# Patient Record
Sex: MALE | NOT HISPANIC OR LATINO | Employment: OTHER | URBAN - METROPOLITAN AREA
[De-identification: names, ages, dates, MRNs, and addresses within clinical notes are randomized per-mention and may not be internally consistent; named-entity substitution may affect disease eponyms.]

---

## 2018-03-13 ENCOUNTER — OFFICE VISIT (OUTPATIENT)
Dept: URGENT CARE | Facility: CLINIC | Age: 28
End: 2018-03-13
Payer: COMMERCIAL

## 2018-03-13 VITALS
OXYGEN SATURATION: 100 % | HEIGHT: 66 IN | HEART RATE: 81 BPM | BODY MASS INDEX: 21.86 KG/M2 | WEIGHT: 136 LBS | TEMPERATURE: 96.9 F | DIASTOLIC BLOOD PRESSURE: 74 MMHG | RESPIRATION RATE: 16 BRPM | SYSTOLIC BLOOD PRESSURE: 129 MMHG

## 2018-03-13 DIAGNOSIS — M25.532 LEFT WRIST PAIN: Primary | ICD-10-CM

## 2018-03-13 PROCEDURE — 99213 OFFICE O/P EST LOW 20 MIN: CPT | Performed by: FAMILY MEDICINE

## 2018-03-13 NOTE — LETTER
March 13, 2018     Patient: Mehran Gomez   YOB: 1990   Date of Visit: 3/13/2018       To Whom it May Concern:    Gloria Burton was seen in my clinic on 3/13/2018  If you have any questions or concerns, please don't hesitate to call           Sincerely,          Jonah Bernal DO        CC: No Recipients

## 2018-03-13 NOTE — PATIENT INSTRUCTIONS
Over-the-counter ibuprofen 20 milligram tablets 3-4 tablets every hours daily with food  Ice 20 minutes 3-4 times daily as needed  Follow-up with PCP if symptoms not improving

## 2018-03-13 NOTE — PROGRESS NOTES
[facility]        NAME: Javi Fields is a 28 y o  male  : 1990    MRN: [id number]  DATE: 2018  TIME: 7:48 PM    Assessment and Plan   Left wrist pain [M25 532]  1  Left wrist pain           Patient Instructions       Follow up with PCP in 3-5 days  Proceed to  ER if symptoms worsen  Chief Complaint     Chief Complaint   Patient presents with    Wrist Pain     R medial; pain 6/10 hurts to  something          History of Present Illness       Patient works a job working with pharmaceutical equipments, he does a lot of hard grasping and repetitive movements with the wrist   He is right-hand dominant he has a lot of pain with grasping  He took off from work yesterday and today  He does require a work note  He states his symptoms have improved since he has not worked past 2 days        Review of Systems   Review of Systems   Musculoskeletal: Positive for arthralgias  Current Medications     No current outpatient prescriptions on file  Current Allergies     Allergies as of 2018 - Reviewed 2018   Allergen Reaction Noted    Carboplatin Anaphylaxis 11/15/2004    Ondansetron Nausea Only 11/15/2004            The following portions of the patient's history were reviewed and updated as appropriate: allergies, current medications, past family history, past medical history, past social history, past surgical history and problem list      Past Medical History:   Diagnosis Date    Brain tumor, astrocytoma ([facility] [address] )        History reviewed  No pertinent surgical history  No family history on file  Medications have been verified          Objective   /74   Pulse 81   Temp (!) 96 9 °F (36 1 °C) (Tympanic)   Resp 16   Ht 5' 6" (1 676 m)   Wt 61 7 kg (136 lb)   SpO2 100%   BMI 21 95 kg/m²        Physical Exam     Physical Exam   Musculoskeletal:   Full active range of motion of the wrist, mild tenderness to palpation along the ulnar styloid, Finkelstein's negative, mild discomfort with abduction of the wrist   Normal sensation, vascularly intact full  with slightly decreased strength

## 2018-09-02 ENCOUNTER — OFFICE VISIT (OUTPATIENT)
Dept: URGENT CARE | Facility: CLINIC | Age: 28
End: 2018-09-02
Payer: COMMERCIAL

## 2018-09-02 VITALS
HEART RATE: 52 BPM | WEIGHT: 127 LBS | HEIGHT: 66 IN | RESPIRATION RATE: 16 BRPM | OXYGEN SATURATION: 100 % | BODY MASS INDEX: 20.41 KG/M2 | TEMPERATURE: 96.6 F

## 2018-09-02 DIAGNOSIS — S61.412A LACERATION OF LEFT HAND WITHOUT FOREIGN BODY, INITIAL ENCOUNTER: Primary | ICD-10-CM

## 2018-09-02 PROCEDURE — 99213 OFFICE O/P EST LOW 20 MIN: CPT | Performed by: PREVENTIVE MEDICINE

## 2018-09-02 PROCEDURE — 12001 RPR S/N/AX/GEN/TRNK 2.5CM/<: CPT | Performed by: PREVENTIVE MEDICINE

## 2018-09-02 NOTE — PROGRESS NOTES
[facility]        NAME: Donovan Born is a 28 y o  male  : 1990    MRN: [id number]  DATE: 2018  TIME: 11:31 AM    Assessment and Plan   Laceration of left hand without foreign body, initial encounter [S61 412A]  1  Laceration of left hand without foreign body, initial encounter           Patient Instructions       Follow up with PCP in 3-5 days  Proceed to  ER if symptoms worsen  Chief Complaint     Chief Complaint   Patient presents with    Hand Laceration     7am packingup from camping  Cut right hand with knife  States last tetanus 3/4 years ago         History of Present Illness       Cut his left hand on a camping knife today  Last tetanus within 5 years  Review of Systems   Review of Systems   Skin: Positive for wound  Current Medications     No current outpatient prescriptions on file  Current Allergies     Allergies as of 2018 - Reviewed 2018   Allergen Reaction Noted    Carboplatin Anaphylaxis 11/15/2004    Ondansetron Nausea Only 11/15/2004            The following portions of the patient's history were reviewed and updated as appropriate: allergies, current medications, past family history, past medical history, past social history, past surgical history and problem list      Past Medical History:   Diagnosis Date    Brain tumor, astrocytoma ([facility] [address] )        No past surgical history on file  No family history on file  Medications have been verified          Objective   Pulse (!) 52   Temp (!) 96 6 °F (35 9 °C)   Resp 16   Ht 5' 6" (1 676 m)   Wt 57 6 kg (127 lb)   SpO2 100%   BMI 20 50 kg/m²        Physical Exam     Physical Exam   Skin:   2 cm laceration left palm   Laceration repair  Date/Time: 2018 11:32 AM  Performed by: Will Garza  Authorized by: Will Garza   Body area: upper extremity  Location details: left hand  Laceration length: 2 cm  Tendon involvement: none  Nerve involvement: none  Vascular damage: no    Wound Dehiscence:  Superficial Wound Dehiscence: simple closure      Procedure Details:  Irrigation solution: saline  Debridement: none  Skin closure: glue  Approximation: close  Approximation difficulty: simple  Dressing: 4x4 sterile gauze

## 2018-09-02 NOTE — LETTER
September 2, 2018     Patient: Ana Comment   YOB: 1990   Date of Visit: 9/2/2018       To Whom It May Concern: It is my medical opinion that Marine Tavares may return to work on 8/5  If you have any questions or concerns, please don't hesitate to call           Sincerely,        Luciano Parrish MD    CC: No Recipients

## 2018-09-02 NOTE — PATIENT INSTRUCTIONS
Keep the wound clean and dry, do not get wet for the next 24 hours  The glue should fall off in 5-7 days  Only recheck if there are signs of infection

## 2018-09-05 ENCOUNTER — OFFICE VISIT (OUTPATIENT)
Dept: FAMILY MEDICINE CLINIC | Facility: CLINIC | Age: 28
End: 2018-09-05
Payer: COMMERCIAL

## 2018-09-05 VITALS
WEIGHT: 127 LBS | DIASTOLIC BLOOD PRESSURE: 60 MMHG | HEIGHT: 66 IN | BODY MASS INDEX: 20.41 KG/M2 | SYSTOLIC BLOOD PRESSURE: 100 MMHG | HEART RATE: 46 BPM | OXYGEN SATURATION: 98 % | RESPIRATION RATE: 16 BRPM

## 2018-09-05 DIAGNOSIS — S65.312D LACERATION OF DEEP PALMAR ARCH OF LEFT HAND, SUBSEQUENT ENCOUNTER: Primary | ICD-10-CM

## 2018-09-05 PROCEDURE — 99213 OFFICE O/P EST LOW 20 MIN: CPT | Performed by: FAMILY MEDICINE

## 2018-09-05 PROCEDURE — 3008F BODY MASS INDEX DOCD: CPT | Performed by: FAMILY MEDICINE

## 2018-09-05 NOTE — PROGRESS NOTES
[facility]        NAME: Monie Bledsoe is a 28 y o  male  : 1990    MRN: [id number]  DATE: 2018  TIME: 3:07 PM    Assessment and Plan   Laceration of deep palmar arch of left hand, subsequent encounter [D50 645S]  1  Laceration of deep palmar arch of left hand, subsequent encounter         No problem-specific Assessment & Plan notes found for this encounter  Patient Instructions     Patient Instructions   I would advise a small amount of bacitracin ointment to the open area the wound and keep this covered and supported with a Band-Aid tape your working  Chief Complaint     Chief Complaint   Patient presents with    Follow-up     urgent care f/u - left hand wound         History of Present Illness       Recheck laceration on the left hand  Had cut with a knife  Seen in urgent care and had glue the area  Has partial adherence, there is some area which is now gait back open again  The wound does look clean  Review of Systems   Review of Systems   Constitutional: Negative for chills, diaphoresis and fatigue  Skin: Positive for wound  Negative for rash  Current Medications     No current outpatient prescriptions on file  Current Allergies     Allergies as of 2018 - Reviewed 2018   Allergen Reaction Noted    Carboplatin Anaphylaxis 11/15/2004    Ondansetron Nausea Only 11/15/2004            The following portions of the patient's history were reviewed and updated as appropriate: allergies, current medications, past family history, past medical history, past social history, past surgical history and problem list      Past Medical History:   Diagnosis Date    Brain tumor, astrocytoma ([facility] [address] )        No past surgical history on file      Family History   Problem Relation Age of Onset    No Known Problems Mother     No Known Problems Father     Substance Abuse Neg Hx     Mental illness Neg Hx Medications have been verified  Objective   /60 (BP Location: Left arm, Patient Position: Sitting, Cuff Size: Standard)   Pulse (!) 46   Resp 16   Ht 5' 6" (1 676 m)   Wt 57 6 kg (127 lb)   SpO2 98%   BMI 20 50 kg/m²        Physical Exam     Physical Exam   Skin:   Left for 2nd webspace laceration approximately 3/4 of an inch long  The proximal side has opened up with about 387 in spread

## 2018-09-05 NOTE — LETTER
September 5, 2018     Patient: Chacho Pouch   YOB: 1990   Date of Visit: 9/5/2018       To Whom it May Concern:    Jasielsherryconsuelo Yin is under my professional care  He was seen in my office on 9/5/2018  He may return to work on 9/6/2018  Excuse work absence due to illness  If you have any questions or concerns, please don't hesitate to call           Sincerely,          Mely Dolna MD        CC: No Recipients

## 2018-09-05 NOTE — PATIENT INSTRUCTIONS
I would advise a small amount of bacitracin ointment to the open area the wound and keep this covered and supported with a Band-Aid tape your working

## 2018-10-17 ENCOUNTER — OFFICE VISIT (OUTPATIENT)
Dept: URGENT CARE | Facility: CLINIC | Age: 28
End: 2018-10-17
Payer: COMMERCIAL

## 2018-10-17 VITALS
SYSTOLIC BLOOD PRESSURE: 118 MMHG | HEIGHT: 66 IN | DIASTOLIC BLOOD PRESSURE: 56 MMHG | HEART RATE: 113 BPM | RESPIRATION RATE: 16 BRPM | TEMPERATURE: 98.4 F | BODY MASS INDEX: 20.41 KG/M2 | WEIGHT: 127 LBS

## 2018-10-17 DIAGNOSIS — J02.9 SORE THROAT: ICD-10-CM

## 2018-10-17 DIAGNOSIS — J40 BRONCHITIS: Primary | ICD-10-CM

## 2018-10-17 LAB — S PYO AG THROAT QL: NEGATIVE

## 2018-10-17 PROCEDURE — 87147 CULTURE TYPE IMMUNOLOGIC: CPT | Performed by: PHYSICIAN ASSISTANT

## 2018-10-17 PROCEDURE — 99213 OFFICE O/P EST LOW 20 MIN: CPT | Performed by: PHYSICIAN ASSISTANT

## 2018-10-17 PROCEDURE — 87070 CULTURE OTHR SPECIMN AEROBIC: CPT | Performed by: PHYSICIAN ASSISTANT

## 2018-10-17 RX ORDER — AZITHROMYCIN 250 MG/1
TABLET, FILM COATED ORAL
Qty: 6 TABLET | Refills: 0 | Status: SHIPPED | OUTPATIENT
Start: 2018-10-17 | End: 2018-10-22

## 2018-10-17 NOTE — PATIENT INSTRUCTIONS
Take azithromycin as directed  Antihistamine such as allegra or zyrtec  Increase fluids and rest  If no improvement in symptoms in 2-3 days, f/u with PCP  If anything worsens, f/u sooner

## 2018-10-17 NOTE — LETTER
October 17, 2018     Patient: Jose D Conley   YOB: 1990   Date of Visit: 10/17/2018       To Whom it May Concern:    Katlyn Swift was seen in my clinic on 10/17/2018  If you have any questions or concerns, please don't hesitate to call           Sincerely,          QU Lori Grove        CC: No Recipients

## 2018-10-17 NOTE — PROGRESS NOTES
NAME: Neeru Saenz is a 28 y o  male  : 1990    MRN: [id number]      Assessment and Plan   Bronchitis [J40]  1  Bronchitis  azithromycin (ZITHROMAX) 250 mg tablet    Throat culture   2  Sore throat  POCT rapid strepA           Patient Instructions   Patient Instructions   Take azithromycin as directed  Antihistamine such as allegra or zyrtec  Increase fluids and rest  If no improvement in symptoms in 2-3 days, f/u with PCP  If anything worsens, f/u sooner    Proceed to ER if symptoms worsen  History of Present Illness     Patient presents accompanied by fiance complaining of chest tightness and productive cough for over a week  He states he started with a URI last week which started to get better but then he acutely worsened a few days ago  He reports having a productive cough and sore throat but denies any sinus p/p, ear pain or runny nose  He reports he has not taken anything for his sx  Patient denies fevers, chills, CP, palpitations, SOB or dyspnea  He denies hx of asthma or COPD or smoking  Reports hx of brain tumor which is in remission  Review of Systems   Review of Systems   Constitutional: Negative for chills and fever  HENT: Positive for congestion and sore throat  Negative for ear pain, sinus pain and sinus pressure  Respiratory: Positive for cough and chest tightness  Negative for shortness of breath and wheezing  Cardiovascular: Negative for chest pain and palpitations  Current Medications       Current Outpatient Prescriptions:     azithromycin (ZITHROMAX) 250 mg tablet, Take 2 tablets today then 1 tablet daily x 4 days, Disp: 6 tablet, Rfl: 0    Current Allergies     Allergies as of 10/17/2018 - Reviewed 10/17/2018   Allergen Reaction Noted    Carboplatin Anaphylaxis 11/15/2004    Ondansetron Nausea Only 11/15/2004              Past Medical History:   Diagnosis Date    Brain tumor, astrocytoma ([facility] [address] )        No past surgical history on file      Family History   Problem Relation Age of Onset    No Known Problems Mother     No Known Problems Father     Substance Abuse Neg Hx     Mental illness Neg Hx          Medications have been verified  The following portions of the patient's history were reviewed and updated as appropriate: allergies, current medications, past family history, past medical history, past social history, past surgical history and problem list     Objective   /56   Pulse (!) 113   Temp 98 4 °F (36 9 °C)   Resp 16   Ht 5' 6" (1 676 m)   Wt 57 6 kg (127 lb)   BMI 20 50 kg/m²      Physical Exam     Physical Exam   Constitutional: He appears well-developed and well-nourished  No distress  HENT:   Tms clear b/l without erythema or bulging  Nasal mucosa without erythema or edema  Oropharynx without erythema or edema or exudates  +PND   Cardiovascular: Normal rate, regular rhythm and normal heart sounds  Pulmonary/Chest: Effort normal  No respiratory distress  He has no wheezes  He has no rales  Mild rhonchi to b/l bases which clear with coughing  Lymphadenopathy:     He has no cervical adenopathy

## 2018-10-19 LAB
BACTERIA THROAT CULT: ABNORMAL
BACTERIA THROAT CULT: ABNORMAL

## 2018-10-22 ENCOUNTER — OFFICE VISIT (OUTPATIENT)
Dept: FAMILY MEDICINE CLINIC | Facility: CLINIC | Age: 28
End: 2018-10-22
Payer: COMMERCIAL

## 2018-10-22 VITALS
SYSTOLIC BLOOD PRESSURE: 120 MMHG | HEIGHT: 66 IN | DIASTOLIC BLOOD PRESSURE: 80 MMHG | HEART RATE: 102 BPM | BODY MASS INDEX: 20.41 KG/M2 | OXYGEN SATURATION: 99 % | TEMPERATURE: 98.6 F | WEIGHT: 127 LBS | RESPIRATION RATE: 18 BRPM

## 2018-10-22 DIAGNOSIS — J45.21 MILD INTERMITTENT ASTHMATIC BRONCHITIS WITH ACUTE EXACERBATION: Primary | ICD-10-CM

## 2018-10-22 PROBLEM — J45.901 ASTHMATIC BRONCHITIS WITH ACUTE EXACERBATION: Status: ACTIVE | Noted: 2018-10-22

## 2018-10-22 PROCEDURE — 3008F BODY MASS INDEX DOCD: CPT | Performed by: FAMILY MEDICINE

## 2018-10-22 PROCEDURE — 1036F TOBACCO NON-USER: CPT | Performed by: FAMILY MEDICINE

## 2018-10-22 PROCEDURE — 99214 OFFICE O/P EST MOD 30 MIN: CPT | Performed by: FAMILY MEDICINE

## 2018-10-22 RX ORDER — PREDNISONE 20 MG/1
TABLET ORAL
Qty: 15 TABLET | Refills: 0 | Status: SHIPPED | OUTPATIENT
Start: 2018-10-22 | End: 2020-03-23 | Stop reason: ALTCHOICE

## 2018-10-22 RX ORDER — ALBUTEROL SULFATE 90 UG/1
2 AEROSOL, METERED RESPIRATORY (INHALATION) EVERY 4 HOURS PRN
Qty: 1 INHALER | Refills: 0 | Status: SHIPPED | OUTPATIENT
Start: 2018-10-22 | End: 2020-11-13

## 2018-10-22 RX ORDER — BUDESONIDE AND FORMOTEROL FUMARATE DIHYDRATE 160; 4.5 UG/1; UG/1
2 AEROSOL RESPIRATORY (INHALATION) 2 TIMES DAILY
Qty: 1 INHALER | Refills: 1 | Status: SHIPPED | OUTPATIENT
Start: 2018-10-22 | End: 2019-10-30 | Stop reason: SDUPTHER

## 2018-10-22 NOTE — PATIENT INSTRUCTIONS
Finish out the [employer] prednisone today, 2 doses in today  Then as per prescription  Use the ProAir inhaler as needed for shortness of breath 2 puffs every 4-6 hours  Also start Symbicort 2 puffs twice daily for the next 2 weeks  I should really listen to her lungs in 4-6 weeks

## 2018-10-22 NOTE — PROGRESS NOTES
[id number] [facility]         NAME: Cruz Quiroz is a 28 y o  male  : 1990    MRN: [id number]  DATE: 2018  TIME: 3:32 PM    Assessment and Plan   Mild intermittent asthmatic bronchitis with acute exacerbation [J45 21]  1  Mild intermittent asthmatic bronchitis with acute exacerbation  predniSONE 20 mg tablet    albuterol (PROAIR HFA) 90 mcg/act inhaler    budesonide-formoterol (SYMBICORT) 160-4 5 mcg/act inhaler       No problem-specific Assessment & Plan notes found for this encounter  Patient Instructions     Patient Instructions   Maury Leanne out the Zithromax-start prednisone today, 2 doses in today  Then as per prescription  Use the ProAir inhaler as needed for shortness of breath 2 puffs every 4-6 hours  Also start Symbicort 2 puffs twice daily for the next 2 weeks  I should really listen to her lungs in 4-6 weeks  Chief Complaint     Chief Complaint   Patient presents with    Follow-up     f/u - urgent care         History of Present Illness       Patient has been ill for the last 3 weeks  Mostly nasal congestion with coughing  He had been seen at the urgent care center 10/17  He had been diagnosed with bronchitis and given a Z-Julio César  He is now finishing the Z-Julio César, but feels more short of breath and feels wheezy  There is no history of asthma  No previous use of inhaler  No current fever or chills are reported  Review of Systems   Review of Systems   Constitutional: Negative for appetite change, chills, diaphoresis and fever  HENT: Positive for congestion  Negative for ear pain, rhinorrhea, sinus pressure and sore throat  Eyes: Negative for discharge, redness and itching  Respiratory: Positive for cough and wheezing  Negative for shortness of breath  Cardiovascular: Negative for chest pain and palpitations  Rapid or slow heart rate   Gastrointestinal: Negative for abdominal pain, diarrhea, nausea and vomiting  Current Medications       Current Outpatient Prescriptions:     albuterol (PROAIR HFA) 90 mcg/act inhaler, Inhale 2 puffs every 4 (four) hours as needed for wheezing, Disp: 1 Inhaler, Rfl: 0    azithromycin (ZITHROMAX) 250 mg tablet, Take 2 tablets today then 1 tablet daily x 4 days, Disp: 6 tablet, Rfl: 0    budesonide-formoterol (SYMBICORT) 160-4 5 mcg/act inhaler, Inhale 2 puffs 2 (two) times a day Rinse mouth after use , Disp: 1 Inhaler, Rfl: 1    predniSONE 20 mg tablet, 1 tab twice daily for 5 days, then 1 tab daily for 5 days  , Disp: 15 tablet, Rfl: 0    Current Allergies     Allergies as of 10/22/2018 - Reviewed 10/22/2018   Allergen Reaction Noted    Carboplatin Anaphylaxis 11/15/2004    Ondansetron Nausea Only 11/15/2004            The following portions of the patient's history were reviewed and updated as appropriate: allergies, current medications, past family history, past medical history, past social history, past surgical history and problem list      Past Medical History:   Diagnosis Date    Brain tumor, astrocytoma ([facility] [address] 2001       No past surgical history on file  Family History   Problem Relation Age of Onset    No Known Problems Mother     No Known Problems Father     Substance Abuse Neg Hx     Mental illness Neg Hx          Medications have been verified  Objective   /80 (BP Location: Left arm, Patient Position: Sitting, Cuff Size: Standard)   Pulse 102   Temp 98 6 °F (37 °C) (Oral)   Resp 18   Ht 5' 6" (1 676 m)   Wt 57 6 kg (127 lb)   SpO2 99%   BMI 20 50 kg/m²        Physical Exam     Physical Exam   Constitutional: He appears well-developed and well-nourished  No distress  HENT:   Head: Normocephalic and atraumatic  Right Ear: Tympanic membrane and external ear normal  No drainage  Left Ear: Tympanic membrane normal  No drainage  Mouth/Throat: No oropharyngeal exudate  Eyes: Conjunctivae and EOM are normal  Right eye exhibits no discharge  Left eye exhibits no discharge  Neck: Normal range of motion  Neck supple  No thyromegaly present  Cardiovascular: Normal rate, regular rhythm and normal heart sounds  Pulmonary/Chest: He is in respiratory distress  He has wheezes  He has no rales  Lymphadenopathy:     He has no cervical adenopathy

## 2018-10-22 NOTE — LETTER
October 22, 2018     Patient: Willis Palacio   YOB: 1990   Date of Visit: 10/22/2018       To Whom it May Concern:    Katie Mora is under my professional care  He was seen in my office on 10/22/2018  He may return to work on 10/25/18  Excuse absence due to illness, may return 10/24 if feeling better  If you have any questions or concerns, please don't hesitate to call           Sincerely,          Gerri Edge MD        CC: No Recipients

## 2019-01-27 ENCOUNTER — HOSPITAL ENCOUNTER (EMERGENCY)
Facility: HOSPITAL | Age: 29
Discharge: HOME | End: 2019-01-27
Attending: EMERGENCY MEDICINE | Admitting: EMERGENCY MEDICINE
Payer: COMMERCIAL

## 2019-01-27 VITALS
HEART RATE: 86 BPM | WEIGHT: 130 LBS | SYSTOLIC BLOOD PRESSURE: 117 MMHG | DIASTOLIC BLOOD PRESSURE: 73 MMHG | BODY MASS INDEX: 20.89 KG/M2 | HEIGHT: 66 IN | TEMPERATURE: 97.5 F | RESPIRATION RATE: 19 BRPM | OXYGEN SATURATION: 96 %

## 2019-01-27 DIAGNOSIS — M62.838 MUSCLE SPASM: Primary | ICD-10-CM

## 2019-01-27 PROCEDURE — 99283 EMERGENCY DEPT VISIT LOW MDM: CPT

## 2019-01-27 RX ORDER — METHOCARBAMOL 500 MG/1
500 TABLET, FILM COATED ORAL ONCE
Status: COMPLETED | OUTPATIENT
Start: 2019-01-27 | End: 2019-01-27

## 2019-01-27 RX ORDER — METHOCARBAMOL 500 MG/1
500 TABLET, FILM COATED ORAL 4 TIMES DAILY PRN
Qty: 20 TABLET | Refills: 0 | Status: SHIPPED | OUTPATIENT
Start: 2019-01-27 | End: 2020-11-13

## 2019-01-27 RX ADMIN — METHOCARBAMOL 500 MG: 500 TABLET, FILM COATED ORAL at 10:29

## 2019-01-27 NOTE — DISCHARGE INSTRUCTIONS
Muscle Spasm   WHAT YOU NEED TO KNOW:   A muscle spasm is a sudden contraction of any muscle or group of muscles  A muscle cramp is a painful muscle spasm  Muscle cramps commonly occur after intense exercise or during pregnancy  They may also be caused by certain medications, dehydration, low calcium or magnesium levels, or another medical condition  DISCHARGE INSTRUCTIONS:   Medicines: You may need the following:  · NSAIDs  help decrease swelling and pain or fever  This medicine is available with or without a doctor's order  NSAIDs can cause stomach bleeding or kidney problems in certain people  If you take blood thinner medicine, always ask your healthcare provider if NSAIDs are safe for you  Always read the medicine label and follow directions  · Take your medicine as directed  Contact your healthcare provider if you think your medicine is not helping or if you have side effects  Tell him of her if you are allergic to any medicine  Keep a list of the medicines, vitamins, and herbs you take  Include the amounts, and when and why you take them  Bring the list or the pill bottles to follow-up visits  Carry your medicine list with you in case of an emergency  Follow up with your healthcare provider as directed: You may need other tests or treatment  You may also be referred to a physical therapist or other specialist  Write down your questions so you remember to ask them during your visits  Self-care:   · Stretch  your muscle to help relieve the cramp  It may be helpful to keep your muscle in the stretched position until the cramp is gone  · Apply heat  to help decrease pain and muscle spasms  Apply heat on the area for 20 to 30 minutes every 2 hours for as many days as directed  · Apply ice  to help decrease swelling and pain  Ice may also help prevent tissue damage  Use an ice pack, or put crushed ice in a plastic bag   Cover it with a towel and place it on your muscle for 15 to 20 minutes every hour or as directed  · Drink more liquids  to help prevent muscle cramps caused by dehydration  Sports drinks may help replace electrolytes you lose through sweat during exercise  Ask your healthcare provider how much liquid to drink each day and which liquids are best for you  · Eat healthy foods , such as fruits, vegetables, whole grains, low-fat dairy products, and lean proteins (meat, beans, and fish)  If you are pregnant, ask your healthcare provider about foods that are high in magnesium and sodium  They may help to relieve cramps during pregnancy  · Massage your muscle  to help relieve the cramp  · Take frequent deep breaths  until the cramp feels better  Lie down while you take the deep breaths so you do not get dizzy or lightheaded  Contact your healthcare provider if:   · You have signs of dehydration, such as a headache, dark yellow urine, dry eyes or mouth, or a fast heartbeat  · You have questions or concerns about your condition or care  Return to the emergency department if:   · You have warmth, swelling, or redness in the cramping muscle  · You have frequent or unrelieved muscle cramps in several different muscles  · You have muscle cramps with numbness, tingling, and burning in your hands and feet  © 2017 [address] Information is for End User's use only and may not be sold, redistributed or otherwise used for commercial purposes  All illustrations and images included in CareNotes® are the copyrighted property of [employer] , [employer]  or Enrique Murphy  The above information is an  only  It is not intended as medical advice for individual conditions or treatments  Talk to your doctor, nurse or pharmacist before following any medical regimen to see if it is safe and effective for you

## 2019-01-27 NOTE — ED PROVIDER NOTES
History  Chief Complaint   Patient presents with    Back Pain     Patient states the he woke up this morning with R Upper back pain  He states that the pain get worse during ambulation and when he takes a deep breath  Patient complains of right upper back and right chest wall muscle spasms since waking several hours ago  Does not recall injury but states he may have slept in awkward position  Spasm is worse with deep breaths and movement  He denies chest pain or SOB  He took Excedrin without relief  Prior to Admission Medications   Prescriptions Last Dose Informant Patient Reported? Taking? albuterol (PROAIR HFA) 90 mcg/act inhaler Not Taking at Unknown time  No No   Sig: Inhale 2 puffs every 4 (four) hours as needed for wheezing   Patient not taking: Reported on 2019    budesonide-formoterol (SYMBICORT) 160-4 5 mcg/act inhaler Not Taking at Unknown time  No No   Sig: Inhale 2 puffs 2 (two) times a day Rinse mouth after use  Patient not taking: Reported on 2019    predniSONE 20 mg tablet Not Taking at Unknown time  No No   Si tab twice daily for 5 days, then 1 tab daily for 5 days  Patient not taking: Reported on 2019       Facility-Administered Medications: None       Past Medical History:   Diagnosis Date    Brain tumor, astrocytoma ([facility] [address] )        History reviewed  No pertinent surgical history  Family History   Problem Relation Age of Onset    No Known Problems Mother     No Known Problems Father     Substance Abuse Neg Hx     Mental illness Neg Hx      I have reviewed and agree with the history as documented  Social History   Substance Use Topics    Smoking status: Former Smoker    Smokeless tobacco: Never Used    Alcohol use No        Review of Systems   All other systems reviewed and are negative  Physical Exam  Physical Exam   Constitutional: He appears well-developed and well-nourished     HENT:   Mouth/Throat: Oropharynx is clear and moist  Eyes: Pupils are equal, round, and reactive to light  Conjunctivae and EOM are normal    Neck: Normal range of motion  Neck supple  No spinous process tenderness present  Cardiovascular: Normal rate, regular rhythm, normal heart sounds and intact distal pulses  Pulmonary/Chest: Effort normal and breath sounds normal  No respiratory distress  He has no wheezes  He exhibits no tenderness  Abdominal: Soft  Bowel sounds are normal  He exhibits no distension  There is no tenderness  Musculoskeletal: Normal range of motion  Thoracic back: He exhibits pain  He exhibits no tenderness, no bony tenderness, no spasm and normal pulse  Neurological: He is alert  He has normal strength  No sensory deficit  GCS eye subscore is 4  GCS verbal subscore is 5  GCS motor subscore is 6  Skin: Skin is warm and dry  No rash noted  Psychiatric: He has a normal mood and affect  Nursing note and vitals reviewed        Vital Signs  ED Triage Vitals   Temperature Pulse Respirations Blood Pressure SpO2   01/27/19 0959 01/27/19 0959 01/27/19 0959 01/27/19 1015 01/27/19 0959   97 5 °F (36 4 °C) 86 19 117/73 96 %      Temp Source Heart Rate Source Patient Position - Orthostatic VS BP Location FiO2 (%)   01/27/19 0959 01/27/19 0959 -- 01/27/19 1015 --   Temporal Monitor  Left arm       Pain Score       01/27/19 0959       Worst Possible Pain           Vitals:    01/27/19 0959 01/27/19 1015   BP:  117/73   Pulse: 86        Visual Acuity  Visual Acuity      Most Recent Value   L Pupil Size (mm)  3   R Pupil Size (mm)  3          ED Medications  Medications   methocarbamol (ROBAXIN) tablet 500 mg (500 mg Oral Given 1/27/19 1029)       Diagnostic Studies  Results Reviewed     None                 No orders to display              Procedures  Procedures       Phone Contacts  ED Phone Contact    ED Course                               MDM  Number of Diagnoses or Management Options  Muscle spasm: new and does not require workup  Patient Progress  Patient progress: improved    CritCare Time    Disposition  Final diagnoses:   Muscle spasm - right chest wall     Time reflects when diagnosis was documented in both MDM as applicable and the Disposition within this note     Time User Action Codes Description Comment    1/27/2019 10:24 AM Era Malhotra Add [K70 481] Muscle spasm     1/27/2019 10:24 AM Era Malhotra Modify [P32 261] Muscle spasm right chest wall      ED Disposition     ED Disposition Condition Comment    Discharge  [facility] discharge to home/self care  Condition at discharge: Good        Follow-up Information    None         Discharge Medication List as of 1/27/2019 10:24 AM      CONTINUE these medications which have NOT CHANGED    Details   albuterol (PROAIR HFA) 90 mcg/act inhaler Inhale 2 puffs every 4 (four) hours as needed for wheezing, Starting Mon 10/22/2018, Normal      budesonide-formoterol (SYMBICORT) 160-4 5 mcg/act inhaler Inhale 2 puffs 2 (two) times a day Rinse mouth after use , Starting Mon 10/22/2018, Normal      predniSONE 20 mg tablet 1 tab twice daily for 5 days, then 1 tab daily for 5 days  , Normal           No discharge procedures on file      ED Provider  Electronically Signed by           Poly Jameson DO  01/27/19 [id number]

## 2019-01-30 ENCOUNTER — VBI (OUTPATIENT)
Dept: ADMINISTRATIVE | Facility: OTHER | Age: 29
End: 2019-01-30

## 2019-01-30 NOTE — TELEPHONE ENCOUNTER
Tatiana Mcmanus    ED Visit Information     Ed visit date: 1/27/2019  Diagnosis Description: Muscle spasm [right chest wall]   In Network? Yes [address]  Discharge status: Home  Discharged with meds ? Yes  Number of ED visits to date: 1  ED Severity:n/a     Outreach Information    Outreach successful: No 1  Date letter mailed:n/a  Date Finalized:Pending    Care Coordination    Follow up appointment with pcp: no None scheduled  Transportation issues ? NA    [employer] type:  7 Day Outreach  Emergent necessity warranted by diagnosis:  No  [facility] PCP:  Yes  Transportation:  Friend[facility]Family Transport  01/30/2019 01:46 PM Phone ([facility] ([place] [phone] (H)   Left Message - requesting a call back    Unable to reach patient regarding a recent ED visit on 1/27 for Muscle spasm [right chest wall]  2nd attempt will be made on 1/31 /31/2019 09:11 AM Phone ([facility] [phone] (H)  Left Message - requesting a call back    Unable to reach patient regarding a recent ED visit on 1/27 for Muscle spasm [right chest wall]  No further telephonic attempts will be made at this time  Letter generated and mailed

## 2019-09-25 ENCOUNTER — OFFICE VISIT (OUTPATIENT)
Dept: FAMILY MEDICINE CLINIC | Facility: CLINIC | Age: 29
End: 2019-09-25
Payer: COMMERCIAL

## 2019-09-25 VITALS
WEIGHT: 130 LBS | TEMPERATURE: 97.8 F | HEIGHT: 66 IN | OXYGEN SATURATION: 98 % | BODY MASS INDEX: 20.89 KG/M2 | DIASTOLIC BLOOD PRESSURE: 74 MMHG | HEART RATE: 79 BPM | SYSTOLIC BLOOD PRESSURE: 118 MMHG

## 2019-09-25 DIAGNOSIS — S29.012A STRAIN OF THORACIC BACK REGION: Primary | ICD-10-CM

## 2019-09-25 PROCEDURE — 3008F BODY MASS INDEX DOCD: CPT | Performed by: NURSE PRACTITIONER

## 2019-09-25 PROCEDURE — 99213 OFFICE O/P EST LOW 20 MIN: CPT | Performed by: NURSE PRACTITIONER

## 2019-09-25 RX ORDER — NAPROXEN 500 MG/1
500 TABLET ORAL 2 TIMES DAILY WITH MEALS
Qty: 60 TABLET | Refills: 0 | Status: SHIPPED | OUTPATIENT
Start: 2019-09-25 | End: 2020-11-13

## 2019-09-25 NOTE — PATIENT INSTRUCTIONS
Naproxen as ordered  Ice/heat/stretches as directed   Back exercise information sheet provided to patient  Call if symptoms do not improve  Call/return with any problems or concerns  Work note given

## 2019-09-25 NOTE — PROGRESS NOTES
[facility]        NAME: Jagdish Fairchild is a 33 y o  male  : 1990    MRN: [id number]  DATE: 2019  TIME: 1:22 PM    Assessment and Plan   Strain of thoracic back region [S29 012A]  1  Strain of thoracic back region  naproxen (NAPROSYN) 500 mg tablet         Patient Instructions     Patient Instructions   Naproxen as ordered  Ice/heat/stretches as directed  Back exercise information sheet provided to patient  Call if symptoms do not improve  Call/return with any problems or concerns  Work note given          Chief Complaint     Chief Complaint   Patient presents with    Back Pain     right side for 2 days          History of Present Illness       Pulled muscle in right side of back 3 days ago  Took some aspirin with little relief  Does feel a little better today  Needs a note for work      Review of Systems   Review of Systems   Constitutional: Negative for activity change and fever  HENT: Negative  Respiratory: Negative  Cardiovascular: Negative  Musculoskeletal: Positive for back pain  Neurological: Negative  Negative for weakness and numbness  Current Medications       Current Outpatient Medications:     albuterol (PROAIR HFA) 90 mcg/act inhaler, Inhale 2 puffs every 4 (four) hours as needed for wheezing (Patient not taking: Reported on 2019 ), Disp: 1 Inhaler, Rfl: 0    budesonide-formoterol (SYMBICORT) 160-4 5 mcg/act inhaler, Inhale 2 puffs 2 (two) times a day Rinse mouth after use   (Patient not taking: Reported on 2019 ), Disp: 1 Inhaler, Rfl: 1    methocarbamol (ROBAXIN) 500 mg tablet, Take 1 tablet (500 mg total) by mouth 4 (four) times a day as needed for muscle spasms (Patient not taking: Reported on 2019), Disp: 20 tablet, Rfl: 0    naproxen (NAPROSYN) 500 mg tablet, Take 1 tablet (500 mg total) by mouth 2 (two) times a day with meals, Disp: 60 tablet, Rfl: 0    predniSONE 20 mg tablet, 1 tab twice daily for 5 days, then 1 tab daily for 5 days  (Patient not taking: Reported on 1/27/2019 ), Disp: 15 tablet, Rfl: 0    Current Allergies     Allergies as of 09/25/2019 - Reviewed 09/25/2019   Allergen Reaction Noted    Carboplatin Anaphylaxis 11/15/2004    Ondansetron Nausea Only 11/15/2004            The following portions of the patient's history were reviewed and updated as appropriate: allergies, current medications, past family history, past medical history, past social history, past surgical history and problem list      Past Medical History:   Diagnosis Date    Brain tumor[facility] astrocytoma ([facility] [address] ) 2001       History reviewed  No pertinent surgical history  Family History   Problem Relation Age of Onset    No Known Problems Mother     No Known Problems Father     Substance Abuse Neg Hx     Mental illness Neg Hx          Medications have been verified  Objective   /74   Pulse 79   Temp 97 8 °F (36 6 °C)   Ht 5' 6" (1 676 m)   Wt 59 kg (130 lb)   SpO2 98%   BMI 20 98 kg/m²        Physical Exam     Physical Exam   Constitutional: He is oriented to person, place, and time  He appears well-developed and well-nourished  No distress  Cardiovascular: Normal rate, regular rhythm and normal heart sounds  No murmur heard  Pulmonary/Chest: Effort normal and breath sounds normal  No respiratory distress  He has no wheezes  Musculoskeletal: Normal range of motion  He exhibits tenderness  He exhibits no edema or deformity  Thoracic back: He exhibits tenderness  He exhibits no swelling and no edema  Neurological: He is alert and oriented to person, place, and time  Skin: Skin is warm and dry  He is not diaphoretic  Psychiatric: He has a normal mood and affect  His behavior is normal  Judgment and thought content normal    Nursing note and vitals reviewed

## 2019-09-25 NOTE — LETTER
September 25, 2019     Patient: Fabricio Limb   YOB: 1990   Date of Visit: 9/25/2019       To Whom it May Concern:    Nick Perez is under my professional care  He was seen in my office on 9/25/2019  He may return to work on 9/26/2019  If you have any questions or concerns, please don't hesitate to call           Sincerely,          CHAPARRO Andrea        CC: No Recipients

## 2019-10-30 DIAGNOSIS — J45.21 MILD INTERMITTENT ASTHMATIC BRONCHITIS WITH ACUTE EXACERBATION: ICD-10-CM

## 2019-10-31 RX ORDER — BUDESONIDE AND FORMOTEROL FUMARATE DIHYDRATE 160; 4.5 UG/1; UG/1
AEROSOL RESPIRATORY (INHALATION)
Qty: 10.2 INHALER | Refills: 0 | Status: SHIPPED | OUTPATIENT
Start: 2019-10-31 | End: 2020-03-23 | Stop reason: ALTCHOICE

## 2020-03-23 ENCOUNTER — TELEMEDICINE (OUTPATIENT)
Dept: FAMILY MEDICINE CLINIC | Facility: CLINIC | Age: 30
End: 2020-03-23
Payer: COMMERCIAL

## 2020-03-23 DIAGNOSIS — J39.9 UPPER RESPIRATORY DISEASE: Primary | ICD-10-CM

## 2020-03-23 PROBLEM — J45.901 ASTHMATIC BRONCHITIS WITH ACUTE EXACERBATION: Status: RESOLVED | Noted: 2018-10-22 | Resolved: 2020-03-23

## 2020-03-23 PROCEDURE — 99213 OFFICE O/P EST LOW 20 MIN: CPT | Performed by: FAMILY MEDICINE

## 2020-03-23 NOTE — PROGRESS NOTES
Virtual Brief Visit    Reason for visit is recent congestion possible URI      Encounter provider Marian Alarcon MD    Provider located at [address] [place] [id number]      Recent Visits  No visits were found meeting these conditions  Showing recent visits within past 7 days and meeting all other requirements     Future Appointments  No visits were found meeting these conditions  Showing future appointments within next 150 days and meeting all other requirements        Patient agrees to participate in a virtual check in via telephone or video visit instead of presenting to the office to address urgent/immediate medical needs  Patient is aware this is a billable service  After connecting through telephone, the patient was identified by name and date of birth  Arielle Stanford was informed that this was a telemedicine visit and that the visit is being conducted through telephone which may not be secure and therefore might not be HIPAA-compliant  My office door was closed  No one else was in the room  He acknowledged consent and understanding of privacy and security of the virtual check-in visit  I informed the patient that I have reviewed his record in Epic and presented the opportunity for him to ask any questions regarding the visit today  The patient initiated communication and agreed to participate  Subjective  Arielle Stanford is a 26 y o  male patient calls in today  His been congested over the last 5-6 days  He has had no fevers throughout  He does have some allergies  Currently has no cough  He does not feel ill  Has been using Claritin to help with congestion  His wife has been ill but she is also feeling better  And has return to work         Past Medical History:   Diagnosis Date    Brain tumor, astrocytoma ([facility] [address] ) 2001       No past surgical history on file      Current Outpatient Medications   Medication Sig Dispense Refill    albuterol (PROAIR HFA) 90 mcg/act inhaler Inhale 2 puffs every 4 (four) hours as needed for wheezing (Patient not taking: Reported on 1/27/2019 ) 1 Inhaler 0    methocarbamol (ROBAXIN) 500 mg tablet Take 1 tablet (500 mg total) by mouth 4 (four) times a day as needed for muscle spasms (Patient not taking: Reported on 9/25/2019) 20 tablet 0    naproxen (NAPROSYN) 500 mg tablet Take 1 tablet (500 mg total) by mouth 2 (two) times a day with meals 60 tablet 0     No current facility-administered medications for this visit  Allergies   Allergen Reactions    Carboplatin Anaphylaxis    Ondansetron Nausea Only     and also nausea ? Assessment    Erasmo Medico telephone assessment is Viral URI versus allergies   Disposition:    Note given for patient to return to work  Warning signs for COVID 19 were reviewed and discussed  As he has no fever and does not feel ill, this may be more his allergies starting to flare  He will use over-the-counter antihistamines and decongestants on an as-needed basis  He may also start nasal steroid spray such as Flonase or Nasacort AQ  Work note provided  I spent 10 minutes with the patient during this virtual check-in visit    [id number]

## 2020-06-18 ENCOUNTER — AMB VIDEO VISIT (OUTPATIENT)
Dept: URGENT CARE | Facility: CLINIC | Age: 30
End: 2020-06-18

## 2020-06-18 ENCOUNTER — AMB VIDEO VISIT (OUTPATIENT)
Dept: OTHER | Facility: HOSPITAL | Age: 30
End: 2020-06-18

## 2020-06-18 DIAGNOSIS — R21 RASH: Primary | ICD-10-CM

## 2020-06-18 PROCEDURE — EVISIT: Performed by: NURSE PRACTITIONER

## 2020-08-25 ENCOUNTER — OFFICE VISIT (OUTPATIENT)
Dept: FAMILY MEDICINE CLINIC | Facility: CLINIC | Age: 30
End: 2020-08-25
Payer: COMMERCIAL

## 2020-08-25 VITALS
HEIGHT: 66 IN | TEMPERATURE: 97.6 F | DIASTOLIC BLOOD PRESSURE: 76 MMHG | WEIGHT: 129 LBS | BODY MASS INDEX: 20.73 KG/M2 | HEART RATE: 80 BPM | SYSTOLIC BLOOD PRESSURE: 122 MMHG | OXYGEN SATURATION: 97 %

## 2020-08-25 DIAGNOSIS — S29.9XXA RIB INJURY: ICD-10-CM

## 2020-08-25 DIAGNOSIS — Z00.00 ENCOUNTER FOR WELLNESS EXAMINATION IN ADULT: Primary | ICD-10-CM

## 2020-08-25 DIAGNOSIS — C72.30 LOW-GRADE OPTIC PATHWAY GLIOMA (HCC): ICD-10-CM

## 2020-08-25 PROCEDURE — 1036F TOBACCO NON-USER: CPT | Performed by: FAMILY MEDICINE

## 2020-08-25 PROCEDURE — 99395 PREV VISIT EST AGE 18-39: CPT | Performed by: FAMILY MEDICINE

## 2020-08-25 NOTE — LETTER
August 25, 2020     Patient: Smitha Post   YOB: 1990   Date of Visit: 8/25/2020       To Whom it May Concern:    Cece Ovalle is under my professional care  He was seen in my office on 8/25/2020  He may return to work on 8/27/20  Excuse work absence due to ribcage injury  If you have any questions or concerns, please don't hesitate to call           Sincerely,          Rosa Elena Thompson MD        CC: No Recipients

## 2020-08-25 NOTE — PATIENT INSTRUCTIONS
Use ibuprofen 600 mg 2-3 times daily to help with rib pain  Ice the area 10-15 minutes 3 or 4 times a day  Avoid further trauma  Will attempt to turn to work 08/27/2020  Referral to [address] [place] [address] to follow-up on previous brain tumor  Wellness Visit for Adults   AMBULATORY CARE:   A wellness visit  is when you see your healthcare provider to get screened for health problems  You can also get advice on how to stay healthy  Write down your questions so you remember to ask them  Ask your healthcare provider how often you should have a wellness visit  What happens at a wellness visit:  Your healthcare provider will ask about your health, and your family history of health problems  This includes high blood pressure, heart disease, and cancer  He or she will ask if you have symptoms that concern you, if you smoke, and about your mood  You may also be asked about your intake of medicines, supplements, food, and alcohol  Any of the following may be done:  · Your weight  will be checked  Your height may also be checked so your body mass index (BMI) can be calculated  Your BMI shows if you are at a healthy weight  · Your blood pressure  and heart rate will be checked  Your temperature may also be checked  · Blood and urine tests  may be done  Blood tests may be done to check your cholesterol levels  Abnormal cholesterol levels increase your risk for heart disease and stroke  You may also need a blood or urine test to check for diabetes if you are at increased risk  Urine tests may be done to look for signs of an infection or kidney disease  · A physical exam  includes checking your heartbeat and lungs with a stethoscope  Your healthcare provider may also check your skin to look for sun damage  · Screening tests  may be recommended  A screening test is done to check for diseases that may not cause symptoms   The screening tests you may need depend on your age, gender, family history, and lifestyle habits  For example, colorectal screening may be recommended if you are 48years old or older  Screening tests you need if you are a woman:   · A Pap smear  is used to screen for cervical cancer  Pap smears are usually done every 3 to 5 years depending on your age  You may need them more often if you have had abnormal Pap smear test results in the past  Ask your healthcare provider how often you should have a Pap smear  · A mammogram  is an x-ray of your breasts to screen for breast cancer  Experts recommend mammograms every 2 years starting at age 48 years  You may need a mammogram at age 52 years or younger if you have an increased risk for breast cancer  Talk to your healthcare provider about when you should start having mammograms and how often you need them  Vaccines you may need:   · Get an influenza vaccine  every year  The influenza vaccine protects you from the flu  Several types of viruses cause the flu  The viruses change over time, so new vaccines are made each year  · Get a tetanus-diphtheria (Td) booster vaccine  every 10 years  This vaccine protects you against tetanus and diphtheria  Tetanus is a severe infection that may cause painful muscle spasms and lockjaw  Diphtheria is a severe bacterial infection that causes a thick covering in the back of your mouth and throat  · Get a human papillomavirus (HPV) vaccine  if you are female and aged 23 to 32 or male 23 to 24 and never received it  This vaccine protects you from HPV infection  HPV is the most common infection spread by sexual contact  HPV may also cause vaginal, penile, and anal cancers  · Get a pneumococcal vaccine  if you are aged 72 years or older  The pneumococcal vaccine is an injection given to protect you from pneumococcal disease  Pneumococcal disease is an infection caused by pneumococcal bacteria  The infection may cause pneumonia, meningitis, or an ear infection      · Get a shingles vaccine  if you are aged 61 or older, even if you have had shingles before  The shingles vaccine is an injection to protect you from the varicella-zoster virus  This is the same virus that causes chickenpox  Shingles is a painful rash that develops in people who had chickenpox or have been exposed to the virus  How to eat healthy:  My Plate is a model for planning healthy meals  It shows the types and amounts of foods that should go on your plate  Fruits and vegetables make up about half of your plate, and grains and protein make up the other half  A serving of dairy is included on the side of your plate  The amount of calories and serving sizes you need depends on your age, gender, weight, and height  Examples of healthy foods are listed below:  · Eat a variety of vegetables  such as dark green, red, and orange vegetables  You can also include canned vegetables low in sodium (salt) and frozen vegetables without added butter or sauces  · Eat a variety of fresh fruits , canned fruit in 100% juice, frozen fruit, and dried fruit  · Include whole grains  At least half of the grains you eat should be whole grains  Examples include whole-wheat bread, wheat pasta, brown rice, and whole-grain cereals such as oatmeal     · Eat a variety of protein foods such as seafood (fish and shellfish), lean meat, and poultry without skin (turkey and chicken)  Examples of lean meats include pork leg, shoulder, or tenderloin, and beef round, sirloin, tenderloin, and extra lean ground beef  Other protein foods include eggs and egg substitutes, beans, peas, soy products, nuts, and seeds  · Choose low-fat dairy products such as skim or 1% milk or low-fat yogurt, cheese, and cottage cheese  · Limit unhealthy fats  such as butter, hard margarine, and shortening  Exercise:  Exercise at least 30 minutes per day on most days of the week  Some examples of exercise include walking, biking, dancing, and swimming   You can also fit in more physical activity by taking the stairs instead of the elevator or parking farther away from stores  Include muscle strengthening activities 2 days each week  Regular exercise provides many health benefits  It helps you manage your weight, and decreases your risk for type 2 diabetes, heart disease, stroke, and high blood pressure  Exercise can also help improve your mood  Ask your healthcare provider about the best exercise plan for you  General health and safety guidelines:   · Do not smoke  Nicotine and other chemicals in cigarettes and cigars can cause lung damage  Ask your healthcare provider for information if you currently smoke and need help to quit  E-cigarettes or smokeless tobacco still contain nicotine  Talk to your healthcare provider before you use these products  · Limit alcohol  A drink of alcohol is 12 ounces of beer, 5 ounces of wine, or 1½ ounces of liquor  · Lose weight, if needed  Being overweight increases your risk of certain health conditions  These include heart disease, high blood pressure, type 2 diabetes, and certain types of cancer  · Protect your skin  Do not sunbathe or use tanning beds  Use sunscreen with a SPF 15 or higher  Apply sunscreen at least 15 minutes before you go outside  Reapply sunscreen every 2 hours  Wear protective clothing, hats, and sunglasses when you are outside  · Drive safely  Always wear your seatbelt  Make sure everyone in your car wears a seatbelt  A seatbelt can save your life if you are in an accident  Do not use your cell phone when you are driving  This could distract you and cause an accident  Pull over if you need to make a call or send a text message  · Practice safe sex  Use latex condoms if are sexually active and have more than one partner  Your healthcare provider may recommend screening tests for sexually transmitted infections (STIs)  · Wear helmets, lifejackets, and protective gear    Always wear a helmet when you ride a bike or motorcycle, go skiing, or play sports that could cause a head injury  Wear protective equipment when you play sports  Wear a lifejacket when you are on a boat or doing water sports  © 2017 [address] Information is for End User's use only and may not be sold, redistributed or otherwise used for commercial purposes  All illustrations and images included in CareNotes® are the copyrighted property of [employer]  [employer]  or Enrique Murphy  The above information is an  only  It is not intended as medical advice for individual conditions or treatments  Talk to your doctor, nurse or pharmacist before following any medical regimen to see if it is safe and effective for you

## 2020-08-25 NOTE — PROGRESS NOTES
ADULT ANNUAL PHYSICAL  [address]    NAME: Jose D Conley  AGE: 26 y o  SEX: male  : 1990     DATE: 2020     Assessment and Plan:     Problem List Items Addressed This Visit     None      Visit Diagnoses     Encounter for wellness examination in adult    -  Primary          Immunizations and preventive care screenings were discussed with patient today  Appropriate education was printed on patient's after visit summary  Counseling:  Dental Health: discussed importance of regular tooth brushing, flossing, and dental visits  Injury prevention: discussed safety/seat belts, safety helmets, smoke detectors, carbon dioxide detectors, and smoking near bedding or upholstery  · Exercise: the importance of regular exercise/physical activity was discussed  Recommend exercise 3-5 times per week for at least 30 minutes  No follow-ups on file  Chief Complaint:     No chief complaint on file  History of Present Illness:     Adult Annual Physical   Patient here for a comprehensive physical exam  The patient reports See problem list     Diet and Physical Activity  · Diet/Nutrition: well balanced diet, limited junk food and consuming 3-5 servings of fruits/vegetables daily  · Exercise: 5-7 times a week on average  Depression Screening  PHQ-9 Depression Screening    PHQ-9:    Frequency of the following problems over the past two weeks:       Little interest or pleasure in doing things:  0 - not at all  Feeling down, depressed, or hopeless:  0 - not at all  PHQ-2 Score:  0       General Health  · Sleep: sleeps well and gets 7-8 hours of sleep on average  · Hearing: normal - bilateral   · Vision: Left field vision cut related to treatment for brain tumor      · Dental: no dental visits for >1 year, brushes teeth once daily and flosses teeth occasionally          Health  · History of STDs?: no      Review of Systems: Review of Systems   Constitutional: Negative for appetite change and fatigue  HENT: Negative for ear pain, postnasal drip, sinus pressure and sore throat  Eyes: Positive for visual disturbance  Negative for redness  Respiratory: Negative for cough, chest tightness, shortness of breath and wheezing  Cardiovascular: Negative for chest pain, palpitations and leg swelling  Gastrointestinal: Negative for abdominal pain, blood in stool, constipation, diarrhea, nausea and vomiting  Genitourinary: Negative for difficulty urinating, flank pain, hematuria and urgency  Musculoskeletal: Negative for arthralgias, back pain, joint swelling and myalgias  Skin: Negative for rash and wound  No suspicious skin lesions   Neurological: Positive for headaches  Negative for dizziness, light-headedness and numbness  Psychiatric/Behavioral: Negative for confusion, decreased concentration, sleep disturbance and suicidal ideas  The patient is not nervous/anxious  Past Medical History:     Past Medical History:   Diagnosis Date    Brain tumor, 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      Past Surgical History:     No past surgical history on file     Social History:        Social History     Socioeconomic History    Marital status: Single     Spouse name: None    Number of children: None    Years of education: None    Highest education level: None   Occupational History    None   Social Needs    Financial resource strain: None    Food insecurity     Worry: None     Inability: None    Transportation needs     Medical: None     Non-medical: None   Tobacco Use    Smoking status: Former Smoker    Smokeless tobacco: Never Used   Substance and Sexual Activity    Alcohol use: No    Drug use: Yes     Types: Marijuana    Sexual activity: None   Lifestyle    Physical activity     Days per week: None     Minutes per session: None    Stress: None   Relationships    Social connections     Talks on phone: None     Gets together: None     Attends Zoroastrianism service: None     Active member of club or organization: None     Attends meetings of clubs or organizations: None     Relationship status: None    Intimate partner violence     Fear of current or ex partner: None     Emotionally abused: None     Physically abused: None     Forced sexual activity: None   Other Topics Concern    None   Social History Narrative    None      Family History:     Family History   Problem Relation Age of Onset    No Known Problems Mother     No Known Problems Father     Substance Abuse Neg Hx     Mental illness Neg Hx       Current Medications:     Current Outpatient Medications   Medication Sig Dispense Refill    albuterol (PROAIR HFA) 90 mcg/act inhaler Inhale 2 puffs every 4 (four) hours as needed for wheezing (Patient not taking: Reported on 1/27/2019 ) 1 Inhaler 0    methocarbamol (ROBAXIN) 500 mg tablet Take 1 tablet (500 mg total) by mouth 4 (four) times a day as needed for muscle spasms (Patient not taking: Reported on 9/25/2019) 20 tablet 0    naproxen (NAPROSYN) 500 mg tablet Take 1 tablet (500 mg total) by mouth 2 (two) times a day with meals (Patient not taking: Reported on 8/25/2020) 60 tablet 0     No current facility-administered medications for this visit  Allergies: Allergies   Allergen Reactions    Carboplatin Anaphylaxis    Ondansetron Nausea Only     and also nausea ? Physical Exam:     /76 (BP Location: Left arm, Patient Position: Sitting, Cuff Size: Large)   Pulse 80   Temp 97 6 °F (36 4 °C) (Tympanic)   Ht 5' 6" (1 676 m)   Wt 58 5 kg (129 lb)   SpO2 97%   BMI 20 82 kg/m²     Physical Exam  Vitals signs and nursing note reviewed  HENT:      Head: Normocephalic and atraumatic  Right Ear: External ear normal       Left Ear: External ear normal    Eyes:      General:         Right eye: No discharge  Left eye: No discharge        Pupils: Pupils are equal, round, and reactive to light    Neck:      Musculoskeletal: Normal range of motion and neck supple  Thyroid: No thyromegaly  Trachea: No tracheal deviation  Cardiovascular:      Rate and Rhythm: Normal rate and regular rhythm  Heart sounds: Normal heart sounds  No murmur  Pulmonary:      Effort: Pulmonary effort is normal  No respiratory distress  Breath sounds: Normal breath sounds  No wheezing  Abdominal:      General: Bowel sounds are normal  There is no distension  Palpations: Abdomen is soft  There is no mass  Tenderness: There is no abdominal tenderness  Musculoskeletal: Normal range of motion  Lymphadenopathy:      Cervical: No cervical adenopathy  Neurological:      Mental Status: He is alert and oriented to person, place, and time  Cranial Nerves: No cranial nerve deficit        Deep Tendon Reflexes: Reflexes normal    Psychiatric:         Mood and Affect: Mood normal          Behavior: Behavior normal            Carmen Price MD   [id number]
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
## 2020-08-28 ENCOUNTER — OFFICE VISIT (OUTPATIENT)
Dept: FAMILY MEDICINE CLINIC | Facility: CLINIC | Age: 30
End: 2020-08-28
Payer: COMMERCIAL

## 2020-08-28 VITALS
BODY MASS INDEX: 20.89 KG/M2 | HEART RATE: 69 BPM | TEMPERATURE: 97.9 F | SYSTOLIC BLOOD PRESSURE: 120 MMHG | HEIGHT: 66 IN | WEIGHT: 130 LBS | OXYGEN SATURATION: 90 % | DIASTOLIC BLOOD PRESSURE: 82 MMHG

## 2020-08-28 DIAGNOSIS — L03.211 CELLULITIS OF FACE: Primary | ICD-10-CM

## 2020-08-28 PROCEDURE — 1036F TOBACCO NON-USER: CPT | Performed by: NURSE PRACTITIONER

## 2020-08-28 PROCEDURE — 3008F BODY MASS INDEX DOCD: CPT | Performed by: FAMILY MEDICINE

## 2020-08-28 PROCEDURE — 99213 OFFICE O/P EST LOW 20 MIN: CPT | Performed by: NURSE PRACTITIONER

## 2020-08-28 PROCEDURE — 3008F BODY MASS INDEX DOCD: CPT | Performed by: NURSE PRACTITIONER

## 2020-08-28 RX ORDER — SULFAMETHOXAZOLE AND TRIMETHOPRIM 800; 160 MG/1; MG/1
1 TABLET ORAL EVERY 12 HOURS SCHEDULED
Qty: 20 TABLET | Refills: 0 | Status: SHIPPED | OUTPATIENT
Start: 2020-08-28 | End: 2020-09-07

## 2020-08-28 RX ORDER — PREDNISONE 20 MG/1
TABLET ORAL
Qty: 15 TABLET | Refills: 0 | Status: SHIPPED | OUTPATIENT
Start: 2020-08-28 | End: 2020-11-13

## 2020-08-28 NOTE — PROGRESS NOTES
[facility]        NAME: Ania Farfan  is a 26 y o  male  : 1990    MRN: [id number]  DATE: 2020  TIME: 2:32 PM    Assessment and Plan   Cellulitis of face [L03 211]  1  Cellulitis of face  sulfamethoxazole-trimethoprim (BACTRIM DS) 800-160 mg per tablet    predniSONE 20 mg tablet         Patient Instructions     There are no Patient Instructions on file for this visit  Chief Complaint     Chief Complaint   Patient presents with    Follow-up     R side facial swollen          History of Present Illness       Swelling to right side of face and right eye x 3 days  Denies fever  No SOB or wheezing  Denies any visual disturbances  Was working outside pulling weeds  Review of Systems   Review of Systems   Constitutional: Negative for activity change and fever  HENT: Positive for facial swelling  Negative for congestion, sinus pressure, sinus pain and tinnitus  Eyes: Negative for pain, discharge, redness and visual disturbance  Respiratory: Negative for chest tightness, shortness of breath and wheezing  Cardiovascular: Negative  Negative for chest pain  Musculoskeletal: Negative for myalgias  Skin: Positive for color change (redness right side of face)  Negative for pallor, rash and wound  Neurological: Negative for dizziness and weakness           Current Medications       Current Outpatient Medications:     albuterol (PROAIR HFA) 90 mcg/act inhaler, Inhale 2 puffs every 4 (four) hours as needed for wheezing (Patient not taking: Reported on 2019 ), Disp: 1 Inhaler, Rfl: 0    methocarbamol (ROBAXIN) 500 mg tablet, Take 1 tablet (500 mg total) by mouth 4 (four) times a day as needed for muscle spasms (Patient not taking: Reported on 2019), Disp: 20 tablet, Rfl: 0    naproxen (NAPROSYN) 500 mg tablet, Take 1 tablet (500 mg total) by mouth 2 (two) times a day with meals (Patient not taking: Reported on 2020), Disp: 60 tablet, Rfl: 0    predniSONE 20 mg tablet, Take one tablet twice daily x 5 days, then one tablet daily x 5 days, Disp: 15 tablet, Rfl: 0    sulfamethoxazole-trimethoprim (BACTRIM DS) 800-160 mg per tablet, Take 1 tablet by mouth every 12 (twelve) hours for 10 days, Disp: 20 tablet, Rfl: 0    Current Allergies     Allergies as of 08/28/2020 - Reviewed 08/28/2020   Allergen Reaction Noted    Carboplatin Anaphylaxis 11/15/2004    Ondansetron Nausea Only 11/15/2004            The following portions of the patient's history were reviewed and updated as appropriate: allergies, current medications, past family history, past medical history, past social history, past surgical history and problem list      Past Medical History:   Diagnosis Date    Brain tumor, 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       History reviewed  No pertinent surgical history  Family History   Problem Relation Age of Onset    No Known Problems Mother     No Known Problems Father     Substance Abuse Neg Hx     Mental illness Neg Hx          Medications have been verified  Objective   /82 (BP Location: Left arm, Patient Position: Sitting, Cuff Size: Standard)   Pulse 69   Temp 97 9 °F (36 6 °C) (Tympanic)   Ht 5' 6" (1 676 m)   Wt 59 kg (130 lb)   SpO2 90%   BMI 20 98 kg/m²        Physical Exam     Physical Exam  Vitals signs and nursing note reviewed  Constitutional:       Appearance: Normal appearance  HENT:      Head: Normocephalic  Eyes:      General:         Right eye: No discharge  Left eye: No discharge  Extraocular Movements: Extraocular movements intact  Pupils: Pupils are equal, round, and reactive to light  Neck:      Musculoskeletal: Normal range of motion  Cardiovascular:      Rate and Rhythm: Normal rate and regular rhythm  Pulses: Normal pulses  Heart sounds: Normal heart sounds  No murmur  No friction rub  No gallop      Pulmonary:      Effort: Pulmonary effort is normal  No respiratory distress  Breath sounds: Normal breath sounds  No wheezing  Chest:      Chest wall: No tenderness  Musculoskeletal: Normal range of motion  Skin:     General: Skin is warm and dry  Findings: Erythema (swellling and erythema ro right side of face  Warm to touch  No draiange noted  ) present  Neurological:      Mental Status: He is alert

## 2020-08-28 NOTE — PATIENT INSTRUCTIONS
Bactrim as ordered for cellulitis right face  Prednisone as ordered for facial swelling  Apply cool compresses/ice to reduce swelling  Keep skin clean and dry  Go to ER with increase swelling, red streaking, fever or visual disturbances  Call with any problems or concerns      Cellulitis, Ambulatory Care   GENERAL INFORMATION:   Cellulitis  is a skin infection caused by bacteria  Common symptoms include the following:   · Fever    · A red, warm, swollen area on your skin    · Pain when the area is touched    · Bumps or blisters (abscess) that may drain pus    · Bumpy, raised skin that feels like an orange peel  Seek immediate care for the following symptoms:   · An increase in pain, redness, warmth, and size    · Red streaks coming from the infected area    · A thin, gray-brown discharge coming from your infected skin area    · A crackling under your skin when you touch it    · Purple dots or bumps on your skin, or bleeding under your skin    · New swelling and pain in your legs    · Sudden trouble breathing or chest pain  Treatment for cellulitis  may include medicines to treat the bacterial infection or decrease pain  The infection may need to be cleaned out  Damaged, dead, or infected tissue may need to be cut away to help your wound heal   Manage your symptoms:   · Elevate your wound above the level of your heart  as often as you can  This will help decrease swelling and pain  Prop your wound on pillows or blankets to keep it elevated comfortably  · Clean your wound as directed  You may need to wash the wound with soap and water  Look for signs of infection  · Wear pressure stockings as directed  The stockings are tight and put pressure on your legs  This improves blood flow and decreases swelling  Prevent cellulitis:   · Wash your hands often  Use soap and water  Wash your hands after you use the bathroom, change a child's diapers, or sneeze  Wash your hands before you prepare or eat food   Use lotion to prevent dry, cracked skin  · Do not share personal items, such as towels, clothing, and razors  · Clean exercise equipment  with germ-killing  before and after you use it  Follow up with your healthcare provider as directed:  Write down your questions so you remember to ask them during your visits  CARE AGREEMENT:   You have the right to help plan your care  Learn about your health condition and how it may be treated  Discuss treatment options with your caregivers to decide what care you want to receive  You always have the right to refuse treatment  The above information is an  only  It is not intended as medical advice for individual conditions or treatments  Talk to your doctor, nurse or pharmacist before following any medical regimen to see if it is safe and effective for you  © 2014 [address] is for End User's use only and may not be sold, redistributed or otherwise used for commercial purposes  All illustrations and images included in CareNotes® are the copyrighted property of [employer]  or Enrique [employer]

## 2020-09-02 ENCOUNTER — TELEPHONE (OUTPATIENT)
Dept: NEUROLOGY | Facility: CLINIC | Age: 30
End: 2020-09-02

## 2020-09-02 NOTE — TELEPHONE ENCOUNTER
Lm advise patient appointment with Dr Fredy Alford on 10- 8:00am reschedule MS meeting 10- 8:30am reschedule letter sent

## 2020-11-13 ENCOUNTER — CONSULT (OUTPATIENT)
Dept: NEUROLOGY | Facility: CLINIC | Age: 30
End: 2020-11-13

## 2020-11-13 VITALS
BODY MASS INDEX: 21.37 KG/M2 | DIASTOLIC BLOOD PRESSURE: 60 MMHG | TEMPERATURE: 97.6 F | HEART RATE: 74 BPM | SYSTOLIC BLOOD PRESSURE: 106 MMHG | WEIGHT: 132.4 LBS

## 2020-11-13 DIAGNOSIS — C72.30 LOW-GRADE OPTIC PATHWAY GLIOMA (HCC): ICD-10-CM

## 2020-11-13 PROBLEM — H53.462 HOMONYMOUS HEMIANOPIA, LEFT: Status: ACTIVE | Noted: 2020-11-13

## 2020-11-13 PROCEDURE — 1036F TOBACCO NON-USER: CPT | Performed by: PSYCHIATRY & NEUROLOGY

## 2020-11-13 PROCEDURE — 99244 OFF/OP CNSLTJ NEW/EST MOD 40: CPT | Performed by: PSYCHIATRY & NEUROLOGY

## 2020-11-19 ENCOUNTER — LAB (OUTPATIENT)
Dept: LAB | Facility: HOSPITAL | Age: 30
End: 2020-11-19
Attending: PSYCHIATRY & NEUROLOGY
Payer: COMMERCIAL

## 2020-11-19 DIAGNOSIS — C72.30 LOW-GRADE OPTIC PATHWAY GLIOMA (HCC): ICD-10-CM

## 2020-11-19 LAB
BUN SERPL-MCNC: 16 MG/DL (ref 5–25)
CREAT SERPL-MCNC: 1.02 MG/DL (ref 0.6–1.3)
GFR SERPL CREATININE-BSD FRML MDRD: 98 ML/MIN/1.73SQ M

## 2020-11-19 PROCEDURE — 84520 ASSAY OF UREA NITROGEN: CPT

## 2020-11-19 PROCEDURE — 36415 COLL VENOUS BLD VENIPUNCTURE: CPT

## 2020-11-19 PROCEDURE — 82565 ASSAY OF CREATININE: CPT

## 2020-11-25 ENCOUNTER — HOSPITAL ENCOUNTER (OUTPATIENT)
Dept: MRI IMAGING | Facility: HOSPITAL | Age: 30
Discharge: HOME | End: 2020-11-25
Attending: PSYCHIATRY & NEUROLOGY
Payer: COMMERCIAL

## 2020-11-25 DIAGNOSIS — C72.30 LOW-GRADE OPTIC PATHWAY GLIOMA (HCC): ICD-10-CM

## 2020-11-25 PROCEDURE — 70543 MRI ORBT/FAC/NCK W/O &W/DYE: CPT

## 2020-11-25 PROCEDURE — G1004 CDSM NDSC: HCPCS

## 2020-11-25 PROCEDURE — 70553 MRI BRAIN STEM W/O & W/DYE: CPT

## 2020-11-25 PROCEDURE — A9585 GADOBUTROL INJECTION: HCPCS | Performed by: PSYCHIATRY & NEUROLOGY

## 2020-11-25 RX ADMIN — GADOBUTROL 6 ML: 604.72 INJECTION INTRAVENOUS at 14:55

## 2020-11-30 ENCOUNTER — TELEPHONE (OUTPATIENT)
Dept: NEUROLOGY | Facility: CLINIC | Age: 30
End: 2020-11-30

## 2020-12-04 ENCOUNTER — TELEPHONE (OUTPATIENT)
Dept: NEUROLOGY | Facility: CLINIC | Age: 30
End: 2020-12-04

## 2020-12-09 ENCOUNTER — DOCUMENTATION (OUTPATIENT)
Dept: NEUROSURGERY | Facility: CLINIC | Age: 30
End: 2020-12-09

## 2021-01-11 ENCOUNTER — OFFICE VISIT (OUTPATIENT)
Dept: NEUROSURGERY | Facility: CLINIC | Age: 31
End: 2021-01-11
Payer: COMMERCIAL

## 2021-01-11 VITALS
HEIGHT: 66 IN | DIASTOLIC BLOOD PRESSURE: 80 MMHG | SYSTOLIC BLOOD PRESSURE: 120 MMHG | WEIGHT: 139.6 LBS | BODY MASS INDEX: 22.43 KG/M2 | TEMPERATURE: 98 F

## 2021-01-11 DIAGNOSIS — C72.30 LOW-GRADE OPTIC PATHWAY GLIOMA (HCC): ICD-10-CM

## 2021-01-11 PROCEDURE — 99243 OFF/OP CNSLTJ NEW/EST LOW 30: CPT | Performed by: NURSE PRACTITIONER

## 2021-01-11 NOTE — PROGRESS NOTES
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[facility])  Patient seen in outpatient office today as a referral from Dr Jasmyn Maldonado with neurology to establish care for history of low grade optic pathway glioma  · Per chart review patient with h/o low-grade optic pathway glioma in 2000, this was an incidental finding when patient hit his head on the tire swing and was seen on imaging  Awaiting some documents from [facility]  Per patient he had a partial surgical resection of mass in 2000 and subsequently underwent chemotherapy in 2001 to 2002 but developed a allergic reaction and chemo was stopped  Imaging:    MRI brain and orbits w/wo 11/25/2020: There is an enlarging complex cystic mass with enhancing mural nodule identified within the medial aspect of the right basal ganglia consistent with patient's history of astrocytoma  Plan:  · Reviewed imaging with patient and significant other  As well as Dr Salazar no significant change in low-grade optic pathway glioma since prior imaging  · Patient will follow-up in 1 year with MRI brain and orbits w w/o for surveillance of low-grade optic pathway glioma or sooner if he develops worsening symptoms  · Awaiting on prior imaging, operative report, as well as biopsy and pathology reports from [facility]  · Recommend patient follow-up with ophthalmologist to establish care  · Patient advised if he develops worsening visual changes, worsening headaches, seizures, weakness in arms or legs, and or memory loss to follow-up sooner  · Reviewed recommendations with patient  Patient showed understanding and is agreeable to recommendations  · Patient made aware to contact Neurosurgery with any further questions or concerns         Diagnoses and all orders for this visit:    Low-grade optic pathway glioma ([facility] [address] )  -     Ambulatory referral to Neurosurgery  -     MRI brain and orbits wo and w contrast; Future            CHIEF COMPLAINT    Chief Complaint   Patient presents with    Consult     Low-grade optic pathway glioma       HISTORY    History of Present Illness     31y o  year old male with past medical history significant for appendectomy, low grade optic pathway glioma, and left homonymous hemianopia  Patient seen in outpatient office today to establish care  He was referred by Dr Citlaly Calvo with Neurology  Per patient and chart review, patient hit his head on a tire swing approximately at the age of 7 or 5 in 5 when an incidental low-grade optic pathway glioma was found on imaging  Patient underwent a partial  resection of tumor at [address] in 2000 and biopsy  Awaiting records from [facility] to confirm  Patient underwent chemotherapy in 2001 until 2002  Patient states he had to stop chemotherapy because he had a allergic reaction to it  Patient was following up with [facility] with regular MRIs  Per patient the mass was not fully resected  Per patient the biopsy was benign  Per chart review patient last saw Dr Severa Barefoot at [address] in 2016 and last saw Dr Heather Bui ophthalmologist in 2017 but has since not followed up  Patient states he was referred to an ophthalmologist but has yet to follow-up  He continues to report a left visual deficit which is unchanged  He states he is not driving  Reviewed imaging with patient and significant other  Will attempt to retrieve prior imaging, operative report, as well as biopsy and pathology report from [facility]  Patient will follow-up in 1 year with MRI brain and orbits for surveillance of low-grade optic pathway glioma or sooner if he develops worsening symptoms  Recommend patient follow up with Ophthalmology  Patient with history of left homonymous hemianopsia  Of note patient's case was reviewed at [facility] which recommended surveillance of brain mass  Patient has no current complaints  He does report occasional headaches which are unchanged    He does report some blurry vision which has worsened he denies wearing glasses or contacts  He denies any headaches, dizziness, chest pain, shortness of breath, abdominal pain, nausea, vomiting, diarrhea, no problems with bowel or bladder, no new weakness or numbness/tingling  HPI    See Discussion    REVIEW OF SYSTEMS    Review of Systems   Constitutional: Negative  HENT: Negative  Eyes: Negative  Respiratory: Negative  Cardiovascular: Negative  Gastrointestinal: Negative  Endocrine: Negative  Genitourinary: Negative  Musculoskeletal: Negative  Skin: Negative  Allergic/Immunologic: Negative  Neurological: Negative  Hematological: Negative  Psychiatric/Behavioral: Negative  ROS reviewed with patient and agree and changes were made as needed  Meds/Allergies     No current outpatient medications on file  No current facility-administered medications for this visit  Allergies   Allergen Reactions    Carboplatin Anaphylaxis    Ondansetron Nausea Only     and also nausea ? PAST HISTORY    Past Medical History:   Diagnosis Date    Brain tumor, astrocytoma ([facility] [address] ) 2001       Past Surgical History:   Procedure Laterality Date    ANTERIOR CRUCIATE LIGAMENT REPAIR  2005    APPENDECTOMY  2000       Social History     Tobacco Use    Smoking status: Former Smoker    Smokeless tobacco: Never Used   Substance Use Topics    Alcohol use: No    Drug use: Yes     Types: Marijuana       Family History   Problem Relation Age of Onset    Emphysema Mother     No Known Problems Father     Substance Abuse Neg Hx     Mental illness Neg Hx          Above history personally reviewed  EXAM    Vitals:Blood pressure 120/80, temperature 98 °F (36 7 °C), temperature source Temporal, height 5' 6" (1 676 m), weight 63 3 kg (139 lb 9 6 oz)  ,Body mass index is 22 53 kg/m²  Physical Exam  Vitals signs reviewed   Exam conducted with a chaperone present (Patient was accompanied by his significant other)  Constitutional:       General: He is awake  He is not in acute distress  Appearance: Normal appearance  He is not ill-appearing  HENT:      Head: Normocephalic and atraumatic  Eyes:      Extraocular Movements: Extraocular movements intact and EOM normal       Conjunctiva/sclera: Conjunctivae normal       Comments: Right eye round and reactive to light  Left eye round and sluggish to react to light   Neck:      Musculoskeletal: Normal range of motion and neck supple  Cardiovascular:      Rate and Rhythm: Normal rate  Pulmonary:      Effort: Pulmonary effort is normal  No respiratory distress  Chest:      Chest wall: No tenderness  Abdominal:      General: There is no distension  Palpations: Abdomen is soft  Tenderness: There is no abdominal tenderness  Musculoskeletal: Normal range of motion  Skin:     General: Skin is warm and dry  Neurological:      Mental Status: He is alert and oriented to person, place, and time  Coordination: Finger-Nose-Finger Test normal       Gait: Gait is intact  Deep Tendon Reflexes: Strength normal       Reflex Scores:       Tricep reflexes are 2+ on the right side and 2+ on the left side  Bicep reflexes are 2+ on the right side and 2+ on the left side  Brachioradialis reflexes are 2+ on the right side and 2+ on the left side  Patellar reflexes are 2+ on the right side and 2+ on the left side  Achilles reflexes are 2+ on the right side and 2+ on the left side  Psychiatric:         Attention and Perception: Attention and perception normal          Mood and Affect: Mood and affect normal          Speech: Speech normal          Behavior: Behavior normal  Behavior is cooperative  Thought Content: Thought content normal          Cognition and Memory: Cognition and memory normal          Judgment: Judgment normal          Neurologic Exam     Mental Status   Oriented to person, place, and time  Follows 2 step commands  Attention: normal  Concentration: normal    Speech: speech is normal   Level of consciousness: alert  Knowledge: good  Able to perform simple calculations  Able to name object  Able to repeat  Normal comprehension  Cranial Nerves     CN III, IV, VI   Extraocular motions are normal    CN III: no CN III palsy  CN VI: no CN VI palsy  Nystagmus: none   Diplopia: none  Conjugate gaze: present    CN V   Facial sensation intact  CN VII   Facial expression full, symmetric  CN VIII   CN VIII normal    Hearing: intact    CN IX, X   CN IX normal      CN XI   CN XI normal      CN XII   CN XII normal    Left visual field deficit which is unchanged per pt     Motor Exam   Muscle bulk: normal  Overall muscle tone: normal  Right arm pronator drift: absent  Left arm pronator drift: absent    Strength   Strength 5/5 throughout  Sensory Exam   Light touch normal    Proprioception normal    JPS and DST intact     Gait, Coordination, and Reflexes     Gait  Gait: normal    Coordination   Finger to nose coordination: normal    Tremor   Resting tremor: absent  Intention tremor: absent  Action tremor: absent    Reflexes   Right brachioradialis: 2+  Left brachioradialis: 2+  Right biceps: 2+  Left biceps: 2+  Right triceps: 2+  Left triceps: 2+  Right patellar: 2+  Left patellar: 2+  Right achilles: 2+  Left achilles: 2+  Right [place]: absent  Left [place]: absent  Right ankle clonus: absent  Left ankle clonus: absent        MEDICAL DECISION MAKING    Imaging Studies:     No results found  I have personally reviewed pertinent reports     and I have personally reviewed pertinent films in PACS

## 2021-01-11 NOTE — PATIENT INSTRUCTIONS
Patient will follow-up in 1 year with MRI brain and orbits or sooner if he develops worsening symptoms

## 2021-01-11 NOTE — ASSESSMENT & PLAN NOTE
Patient seen in outpatient office today as a referral from Dr Matthew Silva with neurology to establish care for history of low grade optic pathway glioma  · Per chart review patient with h/o low-grade optic pathway glioma in 2000, this was an incidental finding when patient hit his head on the tire swing and was seen on imaging  Awaiting some documents from [facility]  Per patient he had a partial surgical resection of mass in 2000 and subsequently underwent chemotherapy in 2001 to 2002 but developed a allergic reaction and chemo was stopped  Imaging:    MRI brain and orbits w/wo 11/25/2020: There is an enlarging complex cystic mass with enhancing mural nodule identified within the medial aspect of the right basal ganglia consistent with patient's history of astrocytoma  Plan:  · Reviewed imaging with patient and significant other  As well as Dr Salazar no significant change in low-grade optic pathway glioma since prior imaging  · Patient will follow-up in 1 year with MRI brain and orbits w w/o for surveillance of low-grade optic pathway glioma or sooner if he develops worsening symptoms  · Awaiting on prior imaging, operative report, as well as biopsy and pathology reports from [facility]  · Recommend patient follow-up with ophthalmologist to establish care  · Patient advised if he develops worsening visual changes, worsening headaches, seizures, weakness in arms or legs, and or memory loss to follow-up sooner  · Reviewed recommendations with patient  Patient showed understanding and is agreeable to recommendations  · Patient made aware to contact Neurosurgery with any further questions or concerns

## 2021-06-19 ENCOUNTER — APPOINTMENT (OUTPATIENT)
Dept: LAB | Facility: CLINIC | Age: 31
End: 2021-06-19

## 2021-06-19 DIAGNOSIS — Z00.8 HEALTH EXAMINATION IN POPULATION SURVEY: ICD-10-CM

## 2021-06-19 LAB
CHOLEST SERPL-MCNC: 217 MG/DL (ref 50–200)
EST. AVERAGE GLUCOSE BLD GHB EST-MCNC: 105 MG/DL
HBA1C MFR BLD: 5.3 %
HDLC SERPL-MCNC: 50 MG/DL
LDLC SERPL CALC-MCNC: 157 MG/DL (ref 0–100)
NONHDLC SERPL-MCNC: 167 MG/DL
TRIGL SERPL-MCNC: 49 MG/DL

## 2021-06-19 PROCEDURE — 36415 COLL VENOUS BLD VENIPUNCTURE: CPT

## 2021-06-19 PROCEDURE — 83036 HEMOGLOBIN GLYCOSYLATED A1C: CPT

## 2021-06-19 PROCEDURE — 80061 LIPID PANEL: CPT

## 2021-07-26 ENCOUNTER — TELEPHONE (OUTPATIENT)
Dept: NEUROSURGERY | Facility: CLINIC | Age: 31
End: 2021-07-26

## 2021-08-02 ENCOUNTER — OFFICE VISIT (OUTPATIENT)
Dept: FAMILY MEDICINE CLINIC | Facility: CLINIC | Age: 31
End: 2021-08-02
Payer: COMMERCIAL

## 2021-08-02 VITALS
OXYGEN SATURATION: 98 % | HEIGHT: 66 IN | BODY MASS INDEX: 22.02 KG/M2 | TEMPERATURE: 97.5 F | SYSTOLIC BLOOD PRESSURE: 125 MMHG | DIASTOLIC BLOOD PRESSURE: 83 MMHG | HEART RATE: 69 BPM | WEIGHT: 137 LBS

## 2021-08-02 DIAGNOSIS — G56.02 CARPAL TUNNEL SYNDROME OF LEFT WRIST: ICD-10-CM

## 2021-08-02 DIAGNOSIS — G54.2 CERVICAL NERVE ROOT IMPINGEMENT: Primary | ICD-10-CM

## 2021-08-02 PROCEDURE — 99214 OFFICE O/P EST MOD 30 MIN: CPT | Performed by: FAMILY MEDICINE

## 2021-08-02 RX ORDER — PREDNISONE 20 MG/1
TABLET ORAL
Qty: 24 TABLET | Refills: 0 | Status: SHIPPED | OUTPATIENT
Start: 2021-08-02 | End: 2022-04-29

## 2021-08-02 NOTE — PROGRESS NOTES
[id number]         NAME: Niki Brewer  is a 31 y o  male  : 1990    MRN: [id number]  DATE: 2021  TIME: 2:46 PM    Assessment and Plan   Cervical nerve root impingement [G54 2]  1  Cervical nerve root impingement  XR spine cervical complete 4 or 5 vw non injury    predniSONE 20 mg tablet   2  Carpal tunnel syndrome of left wrist         No problem-specific Assessment & Plan notes found for this encounter  Patient Instructions     Patient Instructions   Trial of prednisone as ordered  Try to sleep with your neck in a neutral position  Check x-rays of neck  If things are not resolved, consider physical therapy  Chief Complaint     Chief Complaint   Patient presents with    Shoulder Pain         History of Present Illness       Patient comes in today for evaluation of numbness of the left hand particularly 1st 3 digits  Also has some tightness up in the left shoulder  This has been for approximately last 1 month  No recognized acute injury  There is some stiffness of the neck and shoulders  Some improvement with Aleve and massage  Review of Systems   Review of Systems   Constitutional: Negative for appetite change, chills, diaphoresis and fever  HENT: Negative for ear pain, rhinorrhea, sinus pressure and sore throat  Eyes: Negative for discharge, redness and itching  Respiratory: Negative for cough, shortness of breath and wheezing  Cardiovascular: Negative for chest pain and palpitations  Rapid or slow heart rate   Gastrointestinal: Negative for abdominal pain, diarrhea, nausea and vomiting  Musculoskeletal: Positive for myalgias  Negative for arthralgias  Neurological: Positive for numbness  Negative for weakness  Current Medications       Current Outpatient Medications:     predniSONE 20 mg tablet, 1 tablet 3 times daily for 3 days, then 1 tab twice daily for 5 days, then 1 tablet daily for 5 days  , Disp: 24 tablet, Rfl: 0    Current Allergies     Allergies as of 08/02/2021 - Reviewed 08/02/2021   Allergen Reaction Noted    Carboplatin Anaphylaxis 11/15/2004    Ondansetron Nausea Only 11/15/2004            The following portions of the patient's history were reviewed and updated as appropriate: allergies, current medications, past family history, past medical history, past social history, past surgical history and problem list      Past Medical History:   Diagnosis Date    Brain tumor, astrocytoma ([address] ) 2001       Past Surgical History:   Procedure Laterality Date    ANTERIOR CRUCIATE LIGAMENT REPAIR  2005    APPENDECTOMY  2000       Family History   Problem Relation Age of Onset    Emphysema Mother     No Known Problems Father     Substance Abuse Neg Hx     Mental illness Neg Hx          Medications have been verified  Objective   /83 (BP Location: Right arm, Patient Position: Sitting, Cuff Size: Standard)   Pulse 69   Temp 97 5 °F (36 4 °C) (Tympanic)   Ht 5' 6" (1 676 m)   Wt 62 1 kg (137 lb)   SpO2 98%   BMI 22 11 kg/m²        Physical Exam     Physical Exam  Constitutional:       General: He is not in acute distress  Appearance: Normal appearance  He is not ill-appearing  HENT:      Head: Normocephalic and atraumatic  Cardiovascular:      Rate and Rhythm: Normal rate and regular rhythm  Heart sounds: Normal heart sounds  Pulmonary:      Effort: Pulmonary effort is normal  No respiratory distress  Breath sounds: Normal breath sounds  No wheezing  Musculoskeletal:      Left shoulder: Tenderness present  No swelling, deformity, effusion or bony tenderness  Normal range of motion  Normal strength  Normal pulse  Cervical back: Neck supple  Tenderness present  No rigidity  Comments: Approximately positive Phalen sign  Negative Tinel sign  Lymphadenopathy:      Cervical: No cervical adenopathy  Neurological:      Mental Status: He is alert

## 2021-08-02 NOTE — PATIENT INSTRUCTIONS
Trial of prednisone as ordered  Try to sleep with your neck in a neutral position  Check x-rays of neck  If things are not resolved, consider physical therapy

## 2021-08-03 ENCOUNTER — HOSPITAL ENCOUNTER (OUTPATIENT)
Dept: RADIOLOGY | Facility: HOSPITAL | Age: 31
Discharge: HOME | End: 2021-08-03
Payer: COMMERCIAL

## 2021-08-03 DIAGNOSIS — G54.2 CERVICAL NERVE ROOT IMPINGEMENT: ICD-10-CM

## 2021-08-03 PROCEDURE — 72050 X-RAY EXAM NECK SPINE 4/5VWS: CPT

## 2021-08-09 ENCOUNTER — TELEPHONE (OUTPATIENT)
Dept: FAMILY MEDICINE CLINIC | Facility: CLINIC | Age: 31
End: 2021-08-09

## 2021-08-09 DIAGNOSIS — G54.2 CERVICAL NERVE ROOT IMPINGEMENT: Primary | ICD-10-CM

## 2021-08-09 NOTE — TELEPHONE ENCOUNTER
----- Message from Phylicia Guzman MD sent at 8/9/2021 12:59 PM EDT -----  Call patient with lab result-please call with x-ray  There are some degenerative changes  Possible nerve current binge min on the left side where is he having symptoms  He can discuss this with pain management  Complete prednisone to see if that helps

## 2021-08-09 NOTE — RESULT ENCOUNTER NOTE
Call patient with lab result-please call with x-ray  There are some degenerative changes  Possible nerve current binge min on the left side where is he having symptoms  He can discuss this with pain management  Complete prednisone to see if that helps

## 2021-08-10 ENCOUNTER — TELEPHONE (OUTPATIENT)
Dept: FAMILY MEDICINE CLINIC | Facility: CLINIC | Age: 31
End: 2021-08-10

## 2021-08-10 NOTE — TELEPHONE ENCOUNTER
----- Message from Una North  sent at 8/9/2021  9:33 PM EDT -----  Regarding: Non-Urgent Medical Question  Contact: [phone]  Wilfrido Fofana,  Are you able to send in an anti-inflammatory to [facility] in [employer] in [place] for my arm to hold me over until I see Dr Garcia?   Thank you

## 2021-09-01 ENCOUNTER — CONSULT (OUTPATIENT)
Dept: PAIN MEDICINE | Facility: CLINIC | Age: 31
End: 2021-09-01
Payer: COMMERCIAL

## 2021-09-01 VITALS
TEMPERATURE: 97.8 F | SYSTOLIC BLOOD PRESSURE: 120 MMHG | WEIGHT: 138 LBS | DIASTOLIC BLOOD PRESSURE: 80 MMHG | HEIGHT: 66 IN | HEART RATE: 67 BPM | BODY MASS INDEX: 22.18 KG/M2

## 2021-09-01 DIAGNOSIS — M54.12 CERVICAL RADICULOPATHY: Primary | ICD-10-CM

## 2021-09-01 DIAGNOSIS — C72.30 LOW-GRADE OPTIC PATHWAY GLIOMA (HCC): ICD-10-CM

## 2021-09-01 PROCEDURE — 99244 OFF/OP CNSLTJ NEW/EST MOD 40: CPT | Performed by: ANESTHESIOLOGY

## 2021-09-01 NOTE — PROGRESS NOTES
Assessment  1  Cervical radiculopathy - Right    2  Low-grade optic pathway glioma ([address] )        Plan      At this point the patient's pain persists despite time, relative rest, activity modification, and nonsteroidal anti-inflammatories  His pain is significantly interfering with his daily living activities  He has undergone chiropractic treatment, taken oral steroids but his symptoms persist   It is appropriate to order an MRI of the lumbar spine to rule out any significant etiology of his symptoms  Once we obtain MRI results we will proceed from there  My impressions and treatment recommendations were discussed in detail with the patient who verbalized understanding and had no further questions  Discharge instructions were provided  I personally saw and examined the patient and I agree with the above discussed plan of care  This note is created using dictation transcription  It may contain typographical errors, grammatical errors, improperly dictated words, background noise and other errors  Orders Placed This Encounter   Procedures    MRI cervical spine without contrast     Standing Status:   Future     Standing Expiration Date:   9/1/2025     Scheduling Instructions: There is no preparation for this test  Please leave your jewelry and valuables at home, wedding rings are the exception  Magnetic nail polish must be removed prior to arrival for your test  Please bring your insurance cards, a form of photo ID and a list of your medications with you  Arrive 15 minutes prior to your appointment time in order to register  Please bring any prior CT or MRI studies of this area that were not performed at a [facility]  To schedule this appointment, please contact Central Scheduling at [phone]  Prior to your appointment, please make sure you complete the MRI Screening Form when you e-Check in for your appointment   This will be available starting 7 days before your appointment in [address]  You may receive an e-mail with an activation code if you do not have a [facility] account  If you do not have access to a device, we will complete your screening at your appointment  Order Specific Question:   What is the patient's sedation requirement? Answer:   No Sedation     Order Specific Question:   Release to patient through Mychart     Answer:   Immediate     Order Specific Question:   Is order priority selected as STAT? Answer:   No     Order Specific Question:   Reason for Exam (FREE TEXT)     Answer:   Right cervical radiculopathy     No orders of the defined types were placed in this encounter  Referred By: Sandy Lim MD  History of Present Illness    Shanda Jay  is a 31 y o  male with two month history of right-sided neck arm pain with numbness and tingling into his hands  He is unaware of any clear precipitating event denies any trauma or injury  His symptoms are moderate to severe and nearly constant described as subjective weakness of his upper limb  Lying down decreases symptoms will standing bending sitting aggravates his pain  I have personally reviewed and/or updated the patient's past medical history, past surgical history, family history, social history, current medications, allergies, and vital signs today  Review of Systems   Constitutional: Negative for fever and unexpected weight change  HENT: Negative for trouble swallowing  Eyes: Negative for visual disturbance  Respiratory: Negative for shortness of breath and wheezing  Cardiovascular: Negative for chest pain and palpitations  Gastrointestinal: Negative for constipation, diarrhea, nausea and vomiting  Endocrine: Negative for cold intolerance, heat intolerance and polydipsia  Genitourinary: Negative for difficulty urinating and frequency  Musculoskeletal: Positive for myalgias  Negative for arthralgias, gait problem and joint swelling  Skin: Negative for rash  Neurological: Positive for numbness  Negative for dizziness, seizures, syncope, weakness and headaches  Hematological: Does not bruise/bleed easily  Psychiatric/Behavioral: Negative for dysphoric mood  All other systems reviewed and are negative  Patient Active Problem List   Diagnosis    Low-grade optic pathway glioma (HCC)    Homonymous hemianopia, left       Past Medical History:   Diagnosis Date    Brain tumor, astrocytoma ([address] ) 2001    Stroke [facility])        Past Surgical History:   Procedure Laterality Date    ANTERIOR CRUCIATE LIGAMENT REPAIR  2005    APPENDECTOMY  2000       Family History   Problem Relation Age of Onset    Emphysema Mother     No Known Problems Father     Substance Abuse Neg Hx     Mental illness Neg Hx        Social History     Occupational History    Not on file   Tobacco Use    Smoking status: Former Smoker    Smokeless tobacco: Never Used   Substance and Sexual Activity    Alcohol use: No    Drug use: Yes     Types: Marijuana    Sexual activity: Not on file       Current Outpatient Medications on File Prior to Visit   Medication Sig    predniSONE 20 mg tablet 1 tablet 3 times daily for 3 days, then 1 tab twice daily for 5 days, then 1 tablet daily for 5 days  (Patient not taking: Reported on 9/1/2021)     No current facility-administered medications on file prior to visit  Allergies   Allergen Reactions    Carboplatin Anaphylaxis    Ondansetron Nausea Only     and also nausea ? Physical Exam    /80 (BP Location: Left arm, Patient Position: Sitting, Cuff Size: Standard)   Pulse 67   Temp 97 8 °F (36 6 °C)   Ht 5' 6" (1 676 m)   Wt 62 6 kg (138 lb)   BMI 22 27 kg/m²     Constitutional: normal, well developed, well nourished, alert, in no distress and non-toxic and no overt pain behavior    Eyes: anicteric  HEENT: grossly intact  Neck: supple, symmetric, trachea midline and no masses   Pulmonary:even and unlabored  Cardiovascular:No edema or pitting edema present  Skin:Normal without rashes or lesions and well hydrated  Psychiatric:Mood and affect appropriate  Neurologic:Cranial Nerves II-XII grossly intact  Musculoskeletal:normal,   Inspection:  Normal station and gait  Normal cervical curves and head posture  Skin intact without erythema  No sensory loss  There is no atrophy  Palpation:  There is no tenderness to palpation overlying the bilateral cervical paraspinals, cervical facet joints  There is no tenderness over the upper trapezius muscles bilateral  No shoulder tenderness  Motor/Strength:  5/5 strength in the bilateral upper extremities with the exception of 4/5 strength right hand intrinsics  Reflexes:  equal and symmetric in the upper limbs  Sensation:   Sensation decreased in the right C6 distribution to pinwheel  Maneuvers:  Positive Spurling's maneuver on the right  Negative Lhermitte's sign  Imaging  CERVICAL SPINE @  8-3-21     INDICATION:   G54 2: Cervical root disorders, not elsewhere classified      COMPARISON:  October 10, 2007     VIEWS:  XR SPINE CERVICAL COMPLETE 4 OR 5 VW NON INJURY         FINDINGS:     No fracture       Straightening of normal cervical lordosis  Minimal grade 1 degenerative anterolisthesis of C4 relative to C5      Mild disc space narrowing at C5-C6 and C6-C7 with endplate degenerative sclerosis and marginal osteophyte formation  Mild lower lumbar facet and uncinate hypertrophy  There is mild osseous foraminal stenosis on the left at the C6-C7 level       The prevertebral soft tissues are within normal limits        The lung apices are clear      IMPRESSION:     No acute osseous abnormality      Degenerative changes as above      I have personally reviewed pertinent films in PACS

## 2021-09-01 NOTE — PATIENT INSTRUCTIONS
Cervical Radiculopathy   WHAT YOU NEED TO KNOW:   What is cervical radiculopathy? Cervical radiculopathy is a painful condition that happens when a spinal nerve in your neck is pinched or irritated  What causes cervical radiculopathy? Changes in the vertebrae (bones) and discs in your neck can put pressure on a spinal nerve  Discs are natural, spongy cushions between your vertebrae that allow your spine to move  The following can cause a pinched nerve:  · Disc damage  may occur if a disc flattens, bulges, or moves over time  An injury can also cause disc damage  · Cervical spondylosis  is when the vertebrae in your neck break down  This normally occurs as you age  · Growths , such as tumors or cysts (fluid-filled lumps), may grow and press on the nerve  What are the signs and symptoms of cervical radiculopathy? The most common symptom is sharp pain that travels from your neck all the way down your arm  You may have pain in your shoulder, chest, and hand  The pain may get worse with movement or when you cough or sneeze  You may also have any of the following:  · Burning or tingling sensations in your neck or arm     · Numbness or weakness in your arm or hand that makes it hard for you to  objects    · Headaches    How is cervical radiculopathy diagnosed? Your healthcare provider will ask when and how your symptoms began  He will gently press on your neck to check for tenderness and areas that are not shaped correctly  He will also check your arms and hands for numbness or weakness  You may have any of the following:  · Provocative tests  are done to check your response to certain movements  He will ask you to move your neck, shoulder, and arm in different ways  Some movements will increase your symptoms, while others will make you feel better  · An x-ray  is a picture of the bones and tissues in your neck  · An MRI or a CT scan  may be used to take pictures of your neck   The pictures can show problems and changes in your nerves, discs, and vertebrae  You may be given dye to help the pictures show up better  Tell the healthcare provider if you have ever had an allergic reaction to contrast dye  Do not enter the MRI room with anything metal  Metal can cause serious injury  Tell the healthcare provider if you have any metal in or on your body  · An electromyography  is also called an EMG  An EMG is done to test the function of your muscles and the nerves that control them  Electrodes (wires) are placed on the muscle being tested  Needles may be attached to the electrodes and placed in your skin  The electrical activity of your muscles and nerves is measured by a machine attached to the electrodes  Your muscles are tested at rest and during movement  How is cervical radiculopathy treated? · NSAIDs  decrease swelling and pain  This medicine can be bought without a doctor's order  This medicine can cause stomach bleeding or kidney problems in certain people  If you take blood thinner medicine, always ask your healthcare provider if NSAIDs are safe for you  Always read the medicine label and follow the directions on it before using this medicine  · Prescription pain medicine  may be given to decrease pain  Do not wait until the pain is severe before you take this medicine  · Steroids  help decrease pain and swelling  These may be given as a pill or as an injection in your neck  You may need more than 1 injection if your symptoms do not improve after the first treatment  · Physical therapy  helps stretch and strengthen your muscles  Your physical therapist can teach you how to improve your posture and the way you hold your neck  He may also teach you how to be safely active and avoid further injury  He can also help you develop an exercise program that is safe for your back and neck       · Surgery  may be used to treat a pinched nerve if other treatments have not helped after 6 to 12 weeks  How can I manage my symptoms? · Ice  helps decrease swelling and pain  Ice may also help prevent tissue damage  Use an ice pack, or put crushed ice in a plastic bag  Cover it with a towel and place it on your neck for 15 to 20 minutes every hour or as directed  · Rest  when you feel it is needed  Slowly start to do more each day  Return to your daily activities as directed  · Wear a soft collar  You may be given a soft collar to support your neck while you sleep  Wear the soft collar only as directed  · Do light stretches and regular exercise  Your healthcare provider may suggest light stretches to help decrease stiffness in your neck and arm as you recover  After your pain is controlled, you may benefit from regular exercise  Ask what type of exercise is safe for your back and neck  · Review your work area  A comfortable work area can help prevent neck strain  Ask your employer for an ergonomic review to check the position of your desk, chair, phone, and computer  Make any necessary adjustments for your comfort  When should I contact my healthcare provider? · You are losing weight without trying  · Your pain is worse, even with medicine  · One or both hands feel more numb than before, or you cannot move your fingers well  · You have questions or concerns about your condition or care  CARE AGREEMENT:   You have the right to help plan your care  Learn about your health condition and how it may be treated  Discuss treatment options with your healthcare providers to decide what care you want to receive  You always have the right to refuse treatment  The above information is an  only  It is not intended as medical advice for individual conditions or treatments  Talk to your doctor, nurse or pharmacist before following any medical regimen to see if it is safe and effective for you    © Copyright [employer] 2021 Information is for End User's use only and may not be sold, redistributed or otherwise used for commercial purposes   All illustrations and images included in CareNotes® are the copyrighted property of A D A M , Inc  or [facility] Mayda Salvador

## 2021-09-22 ENCOUNTER — HOSPITAL ENCOUNTER (OUTPATIENT)
Dept: MRI IMAGING | Facility: HOSPITAL | Age: 31
Discharge: HOME | End: 2021-09-22
Attending: ANESTHESIOLOGY
Payer: COMMERCIAL

## 2021-09-22 DIAGNOSIS — C72.30 LOW-GRADE OPTIC PATHWAY GLIOMA (HCC): ICD-10-CM

## 2021-09-22 DIAGNOSIS — M54.12 CERVICAL RADICULOPATHY: ICD-10-CM

## 2021-09-22 PROCEDURE — G1004 CDSM NDSC: HCPCS

## 2021-09-22 PROCEDURE — 72141 MRI NECK SPINE W/O DYE: CPT

## 2021-09-24 ENCOUNTER — TELEPHONE (OUTPATIENT)
Dept: PAIN MEDICINE | Facility: CLINIC | Age: 31
End: 2021-09-24

## 2021-09-24 NOTE — TELEPHONE ENCOUNTER
S/w pt, advised of above  Pt verbalized agreement, provided phone number for [facility] per Pt's request - closer to home       Orders please

## 2021-09-24 NOTE — TELEPHONE ENCOUNTER
----- Message from Jayna Bull DO sent at 9/24/2021  8:03 AM EDT -----  MRI cervical spine:1   Mild degenerative canal stenosis at C5-6 and C6-7      2  Moderate to severe left foraminal stenosis at C6-7  I am unsure that this explains his right-sided symptoms and would recommended EMG

## 2021-12-01 ENCOUNTER — NEW REFERRAL (OUTPATIENT)
Dept: URBAN - METROPOLITAN AREA CLINIC 6 | Facility: CLINIC | Age: 31
End: 2021-12-01

## 2021-12-01 DIAGNOSIS — H47.293: ICD-10-CM

## 2021-12-01 DIAGNOSIS — H35.3130: ICD-10-CM

## 2021-12-01 PROCEDURE — 99245 OFF/OP CONSLTJ NEW/EST HI 55: CPT

## 2021-12-01 PROCEDURE — G8427 DOCREV CUR MEDS BY ELIG CLIN: HCPCS

## 2021-12-01 PROCEDURE — 92083 EXTENDED VISUAL FIELD XM: CPT

## 2021-12-01 PROCEDURE — 92134 CPTRZ OPH DX IMG PST SGM RTA: CPT

## 2021-12-01 PROCEDURE — 2019F DILATED MACUL EXAM DONE: CPT

## 2021-12-01 ASSESSMENT — VISUAL ACUITY
OD_PH: 20/40
OS_SC: 20/60
OD_SC: 20/60
OS_PH: 20/25

## 2021-12-01 ASSESSMENT — TONOMETRY
OD_IOP_MMHG: 13
OS_IOP_MMHG: 13

## 2021-12-29 ENCOUNTER — HOSPITAL ENCOUNTER (OUTPATIENT)
Dept: MRI IMAGING | Facility: HOSPITAL | Age: 31
Discharge: HOME | End: 2021-12-29
Payer: COMMERCIAL

## 2021-12-29 DIAGNOSIS — C72.30 LOW-GRADE OPTIC PATHWAY GLIOMA (HCC): ICD-10-CM

## 2021-12-29 PROCEDURE — 70553 MRI BRAIN STEM W/O & W/DYE: CPT

## 2021-12-29 PROCEDURE — G1004 CDSM NDSC: HCPCS

## 2021-12-29 PROCEDURE — A9585 GADOBUTROL INJECTION: HCPCS | Performed by: NURSE PRACTITIONER

## 2021-12-29 PROCEDURE — 70543 MRI ORBT/FAC/NCK W/O &W/DYE: CPT

## 2021-12-29 RX ADMIN — GADOBUTROL 6 ML: 604.72 INJECTION INTRAVENOUS at 14:28

## 2022-01-17 ENCOUNTER — TELEMEDICINE (OUTPATIENT)
Dept: NEUROSURGERY | Facility: CLINIC | Age: 32
End: 2022-01-17
Payer: COMMERCIAL

## 2022-01-17 DIAGNOSIS — C72.30 LOW-GRADE OPTIC PATHWAY GLIOMA (HCC): Primary | ICD-10-CM

## 2022-01-17 DIAGNOSIS — H53.462 HOMONYMOUS HEMIANOPIA, LEFT: ICD-10-CM

## 2022-01-17 PROCEDURE — 99213 OFFICE O/P EST LOW 20 MIN: CPT | Performed by: NURSE PRACTITIONER

## 2022-01-17 NOTE — ASSESSMENT & PLAN NOTE
· As addressed in HPI  · Records indicate left homonymous hemianopsia  · As per patient he has a bilateral hemianopsia right and left peripheral visual field cut, reports he is legally blind  Central vision intact    · Complete Dr Haseeb gAuilar [facility] initial visit December 1 2021 , report attached in [facility] [place] [address]

## 2022-01-17 NOTE — PATIENT INSTRUCTIONS
Brain Tumors   WHAT YOU NEED TO KNOW:   What is a brain tumor? A brain tumor is a mass that grows in your brain, or in an area near the brain  Examples include nerves in your skull, pituitary gland, or the membranes that cover your brain  The tumor may start in your brain or travel to your brain from another body area  There are many kinds of brain tumors  Each kind is named for where it begins and what it does in the brain  A tumor may be malignant (cancer), or benign (not cancer)  It may grow quickly or slowly  What causes or increases my risk for a brain tumor? · A family history of brain tumors    · Older age    · Exposure to radiation    · Certain types of cancer that can spread to the brain such as breast, lung, or colon cancer    · A weakened immune system    What are the signs and symptoms of a brain tumor? Signs and symptoms will depend on the kind of tumor you have, and where it is in your brain  Brain tumors often cause problems on only one side of the body  Some tumors can cause problems on both sides  You may have any of the following:  · New or different headaches, or headaches that become more frequent or severe    · Speech, hearing, or memory problems    · Nausea or vomiting    · Vision problems, such as blurred or double vision, or hearing loss    · Confusion, personality changes, or seizures    · Areas that are weak or numb in an arm or leg, or loss of balance    · Bladder or bowel control problems    How is a brain tumor diagnosed? Your healthcare provider will examine you and ask about your symptoms  Tell him if you have ever had cancer, and what kind you had  Tell him if you have a family history of brain tumors or cancer  You may need any of the following:  · A neurologic exam  can show healthcare providers how your brain is working  Other names for this test include neuro signs, neuro checks, or neuro status  Healthcare providers will check how your pupils react to light   They may check your memory and how easily you wake up  Your hand grasp and balance may also be tested  · X-ray, MRI, or CT pictures  may show the size and location of any tumors  You may be given contrast liquid help to help the tumors show up better  Tell the healthcare provider if you have ever had an allergic reaction to contrast liquid  Do not enter the MRI room with any metal  Metal can cause serious injury  Tell the healthcare provider if you have any metal in or on your body  · A PET scan  is used to take pictures of areas in your body  A small amount of radiation, called tracer, is put into your body before the PET scan  The tracer shows how chemicals, such as glucose (sugar), are working in your tissues  A PET scan may show a tumor or if a tumor has spread  · A biopsy  is a procedure used to take a sample of the tumor to be tested  Tests will show if the tumor is benign or malignant, and what kind of tumor it is  · A lumbar puncture,  or spinal tap, is a procedure used to take a sample of spinal cord fluid  The sample is tested for cancer cells in the fluid  This test will be used if other tests show it is a cancer that spreads through spinal fluid  How is a brain tumor treated? A tumor that travels to the brain will need treatment for the original kind of cancer  For example, breast cancer that travels to the brain may respond to drugs used to treat breast cancer  You may need treatment for the tumor, or for problems caused by the tumor  You may need any of the following:  · Medicines  may be given to lower your risk for seizures, or to reduce swelling  This may help relieve symptoms such as headaches  Hormone medicine may also be given if a part of the brain that produces hormones is affected  You may also need blood thinners to prevent a blood clot  A brain tumor can increase your risk for blood clots  · Chemotherapy  is medicine to help kill cancer cells   The medicine is usually given through an IV  · Radiation  is used to help treat brain tumors and to prevent new tumors from forming  Radiation can help you maintain your normal daily activities during treatment  · Surgery  may be used to remove the tumor  This is done if the tumor is in an area where surgery can be done safely  During surgery, such as craniotomy, healthcare providers will open your skull and remove the tumor  Surgery is used for both malignant and benign tumors  Any brain tumor can grow into another part of the brain and destroy healthy brain tissue  The goal of surgery is to remove as much of the tumor as possible  · Radiosurgery  targets cancer cells without harming healthy brain tissue  You may need radiosurgery if you have more than one tumor or if you cannot have open surgery, such as a craniotomy  What can I do to manage my symptoms? · Ask for support  A brain tumor can change the way you act, think, and feel  Your memory, concentration, and ability to learn may decline  You may act without thinking or become more emotional  Talk with family and friends about these changes and about continuing care, treatments, and home services  Go to all follow-up appointments  · Rest as needed  You may need more rest than usual, especially after cancer treatment  · Do not smoke  Nicotine and other chemicals in cigarettes and cigars can cause brain and lung damage  Ask your healthcare provider for information if you currently smoke and need help to quit  E-cigarettes or smokeless tobacco still contain nicotine  Talk to your healthcare provider before you use these products  · Eat a variety of healthy foods  Healthy foods include fruits, vegetables, whole-grain breads, low-fat dairy products, lean meats, fish, nuts, and cooked beans  Try to eat small meals if you have any nausea  Ask if you need to be on a special diet  · Exercise as directed    Exercise can increase your energy and help keep your immune system strong  Ask your healthcare provider how much exercises you need and which exercises are best for you  · Go to physical, occupational, or speech therapy as directed  A physical therapist can help you build muscle strength and coordination  An occupational therapist can help you find ways to do your daily activities more easily  A speech therapist can help you if your tumor caused problems with speaking  Where can I find support and more information? · [employer]  [address] [facility] , [address] [place], [place] [address]way  Phone: [phone]   Web Address: [employer] at  org    Call your local emergency number (911 in the [address]) if:   · You have a seizure  When should I seek immediate care? · Your arm or leg feels warm, tender, and painful  It may look swollen and red  · You have new problems walking or moving one side of your body  · You have new or worsening headaches or body swelling  When should I call my doctor? · You have questions or concerns about your condition or care  CARE AGREEMENT:   You have the right to help plan your care  Learn about your health condition and how it may be treated  Discuss treatment options with your healthcare providers to decide what care you want to receive  You always have the right to refuse treatment  The above information is an  only  It is not intended as medical advice for individual conditions or treatments  Talk to your doctor, nurse or pharmacist before following any medical regimen to see if it is safe and effective for you  © Copyright [employer] 2021 Information is for End User's use only and may not be sold, redistributed or otherwise used for commercial purposes   All illustrations and images included in CareNotes® are the copyrighted property of [employer]  or [facility] [employer]

## 2022-01-17 NOTE — ASSESSMENT & PLAN NOTE
· As addressed in HPI  · BX Fibrillary astrocytoma, optic pathway glioma  · Records received for [place], under book marks and in [facility] care everywhere  · Completed Ophthalmology referral -DR Jaqueline Ludwig 12/1/2021  · Reports increased frequency of headaches  Over past 6 months  At least 1 x per week , severity 7/10 location bilateral temporals  Treatment Aleve often does not relieve , will nap when wake up headache gone  · Blurry vision -he discussed w/Dr Stephanie Reeder treatment recommendation corrective lenses  Patient harsh contact office for visit with optometrist for corrective lenses  · Denies balance loss, inability to control eye movement, loss of appetite, sleeplissness          Imagining  · MRI Brain w/wo contrast IMPRESSION: Stable complex cystic suprasellar/hypothalamic mass, consistent with low-grade astrocytoma  Local mass effect            PLAN  · Patient advised if he develops worsening visual changes, worsening headaches, seizures, weakness in arms or legs, and or memory loss to follow-up sooner,  · Reviewed imaging with patient no significant change in low-grade optic pathway glioma, slight increase in cyst size , local mass effect  since prior imaging  · As per guidelines [facility] CNS surveillance every 6-12 months mRI w/ and wo contrast --ordered MRI , RTO in 6 months   · Recent MRI with  Local mass effect , no hydrocephalus , no transependymal  Edema, complained of increased frequency of headaches    · Advised to contact Neurosurgery with any further questions or concerns

## 2022-01-17 NOTE — PROGRESS NOTES
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     · As addressed in HPI  · BX Fibrillary astrocytoma, optic pathway glioma  · Records received for [place], under book marks and in [facility] care everywhere  · Completed Ophthalmology referral -DR Taty Wilcox 12/1/2021  · Reports increased frequency of headaches  Over past 6 months  At least 1 x per week , severity 7/10 location bilateral temporals  Treatment Aleve often does not relieve , will nap when wake up headache gone  · Blurry vision -he discussed w/Dr Walter Richardson treatment recommendation corrective lenses  Patient hasrh contact office for visit with optometrist for corrective lenses  · Denies balance loss, inability to control eye movement, loss of appetite, sleeplissness          Imagining  · MRI Brain w/wo contrast IMPRESSION: Stable complex cystic suprasellar/hypothalamic mass, consistent with low-grade astrocytoma  Local mass effect            PLAN  · Patient advised if he develops worsening visual changes, worsening headaches, seizures, weakness in arms or legs, and or memory loss to follow-up sooner,  · Reviewed imaging with patient no significant change in low-grade optic pathway glioma, slight increase in cyst size , local mass effect  since prior imaging  · As per guidelines [facility] CNS surveillance every 6-12 months mRI w/ and wo contrast --ordered MRI , RTO in 6 months   · Recent MRI with  Local mass effect , no hydrocephalus , no transependymal  Edema, complained of increased frequency of headaches    · Advised to contact Neurosurgery with any further questions or concerns           Relevant Orders    MRI brain and orbits wo and w contrast       Other    Homonymous hemianopia, left     · As addressed in HPI  · Records indicate left homonymous hemianopsia  · As per patient he has a bilateral hemianopsia right and left peripheral visual field cut, reports he is legally blind  Central vision intact  · Complete Dr Noel Buitrago, [facility] initial visit December 1 2021 , report attached in [address] book moore           Relevant Orders    MRI brain and orbits wo and w contrast               Reason for visit is   Chief Complaint   Patient presents with    Virtual Regular Visit     1 year follow up with MRI Brain        Encounter provider Asiya Dallas    Provider located at [address] [place]  [address] [facility] [id number] [phone]      Recent Visits  No visits were found meeting these conditions  Showing recent visits within past 7 days and meeting all other requirements  Today's Visits  Date Type Provider Dept   01/17/22 Telemedicine Glenys Lacy today's visits and meeting all other requirements  Future Appointments  No visits were found meeting these conditions  Showing future appointments within next 150 days and meeting all other requirements       The patient was identified by name and date of birth  Eliane Willis  was informed that this is a telemedicine visit and that the visit is being conducted through [address] [place] [address] Now and patient was informed that this is a secure, HIPAA-compliant platform  He agrees to proceed     My office door was closed  No one else was in the room  He acknowledged consent and understanding of privacy and security of the video platform  The patient has agreed to participate and understands they can discontinue the visit at any time  Patient is aware this is a billable service  Wendi Odonnell  is a 27 y o  male          HPI   Incidental finding on imagining right sided cystic mass within the posterior inferior right frontal lobe and basal ganglia with localized mass effect on frontal horn of lateral ventricle and foramen of interiano  He was treated in ED after hitting head on a tire swing at age approximately 10-11     At age 7 diagnosed with cystic mass glioma    Surgery at age 5 for partial resection of tumor in 2000 at [address] secondary to location and resultant left homonymous hemianopsia  Biopsy at [facility]  Benign, Fibrillary astrocytoma , optic pathway glioma  His primary diagnosis is Low grade optic pathway glioma  As per patient he has a bilateral hemianopsia right and left peripheral visual field cut, reports he is legally blind  Central vision intact  He underwent chemotherapy in 2001 - 2002 , stopped secondary to an allergic reaction    Dr America Aldana, oncology at [address], last visit  2016  Dr Bulmaro Daniels  opthalmologic  In 2017  Following with [facility] for annual surveillance until he aged out      Established care with [facility] neurosurgery Dr Ariana Hoffmann 2021 was referred for Dr Karin Nichols @ [facility]  Initial visit with her 11/13/2020  He has longstanding left sided homonymous hemianopsia, but patient reports today is left and right sided  Dr aKrin Nichols advised patient to keep chop / [facility] as a resource due to long standing care and need for continued maintance, NS, Oncology and optho fercho Casillas He reported at neurology visit  blurriness of vision, referral to Dr Mahad Mac Ophthalmology initial visit December 1 2021  Reports and occasional headache  MRI Imagining Imaging in 2016 with similar mass effect on the 3rd ventricl , right cerebral peduncle, right thalamus and hypothalamus  Ectasia involving the supraclinoid segment of the right ICA is also similar    Initial neurosurgery consult @ [facility] 1/11/2021   MRI imagining 11/25/20 --There is an enlarging complex cystic mass with enhancing mural nodule identified within the medial aspect of the right basal ganglia consistent with patient's history of astrocytoma        Past Medical History:   Diagnosis Date    Brain tumor, astrocytoma ([address] ) 2001    History of chemotherapy     Stroke [facility])        Past Surgical History:   Procedure Laterality Date    ANTERIOR CRUCIATE LIGAMENT REPAIR  2005    APPENDECTOMY  2000    OTHER SURGICAL HISTORY  11/07/2000    [facility] 2000 - crani, biopsy / resection        Current Outpatient Medications   Medication Sig Dispense Refill    predniSONE 20 mg tablet 1 tablet 3 times daily for 3 days, then 1 tab twice daily for 5 days, then 1 tablet daily for 5 days  (Patient not taking: Reported on 9/1/2021) 24 tablet 0     No current facility-administered medications for this visit  Allergies   Allergen Reactions    Carboplatin Anaphylaxis    Ondansetron Nausea Only     and also nausea ? Review of Systems   Constitutional: Negative  HENT: Negative  Eyes: Negative  Respiratory: Negative  Cardiovascular: Negative  Gastrointestinal: Negative  Endocrine: Positive for cold intolerance (extremity numbness)  Genitourinary: Negative  Musculoskeletal: Negative  Skin: Negative  Allergic/Immunologic: Negative  Neurological: Positive for headaches (occasional, at least once weekly, moderate)  Negative for dizziness, tremors, seizures, speech difficulty, weakness, light-headedness and numbness  Hematological: Negative  Psychiatric/Behavioral: Negative  Video Exam    There were no vitals filed for this visit  Physical Exam  Constitutional:       General: He is not in acute distress  Appearance: Normal appearance  He is not toxic-appearing  HENT:      Head: Normocephalic and atraumatic  Pulmonary:      Effort: Pulmonary effort is normal  No respiratory distress  Musculoskeletal:         General: Normal range of motion  Right lower leg: No edema  Left lower leg: No edema  Neurological:      General: No focal deficit present  Mental Status: He is alert and oriented to person, place, and time     Psychiatric:         Mood and Affect: Mood normal  Behavior: Behavior normal            Neurologic Exam     Mental Status   Oriented to person, place, and time  Oriented to person  Oriented to place  Oriented to country, city, area, street and number  Oriented to year, month, date, day and season  Registration: recalls 1 of 3 objects  Attention: normal    Level of consciousness: alert  Able to perform simple calculations  MRI OF THE BRAIN AND ORBITS - WITH AND WITHOUT CONTRAST     INDICATION: C72 30: Malignant neoplasm of unspecified optic nerve      COMPARISON:  MR 11/25/2020     TECHNIQUE:  Brain:  Sagittal T1, axial T2  Axial FLAIR  Axial [place], Axial DWI  Axial T1 post contrast    Axial BRAVO post contrast   Orbits: Coronal T1, coronal fat-suppressed T2  Axial fat-suppressed T2, axial and coronal fat-suppressed T1 postcontrast         IV Contrast:  6 mL of Gadobutrol injection (SINGLE-DOSE)      IMAGE QUALITY:  Diagnostic      FINDINGS:     BRAIN PARENCHYMA:  Stable complex right suprasellar/parasellar mass contiguous with the right optic tract and lateral chiasm, extending cephalad into the basal ganglia and inferior periventricular soft tissue  The thin rim of hemosiderin, bright signal   of the cystic component and enhancement of a mural nodule along the inferolateral aspect of the cyst are unchanged  Cyst approximately 3 2 x 2 4 x 2 6 cm in greatest transverse, cephalocaudad and AP dimension respectively  No edema in the adjacent   parenchyma  There is local mass effect on the 3rd and right ventricle overtly stable without evidence for hydrocephalus  Mass inseparable from the hypothalamus and posterior most optic tract  Stable encephalomalacic changes along the anterior aspect   of the right temporal lobe likely, reflecting post surgical changes  There are no white matter changes in the cerebral hemispheres   Normal postcontrast imaging      ORBITS:  Globes and optic nerves are normal   Orbital contents normal   Right chiasm and optic tracts are depressed      VENTRICLES:  Normal    SELLA AND PITUITARY GLAND:  Normal   PARANASAL SINUSES:  Trace scattered sinus mucosal thickening    VASCULATURE:  Evaluation of the major intracranial vasculature demonstrates appropriate flow voids    CALVARIUM AND SKULL BASE:  Right pterional craniotomy    EXTRACRANIAL SOFT TISSUES:  Normal    IMPRESSION:   Stable complex cystic suprasellar/hypothalamic mass, consistent with low-grade astrocytoma  Local mass effect         11/25/2020 MRI OF THE BRAIN AND ORBITS - WITH AND WITHOUT CONTRAST     FINDINGS:   BRAIN PARENCHYMA:  Compared to prior remote CTs, there is once again identified a complex cystic mass within the medial aspect of the right basal ganglia  This mass now measures 3 0 x 2 7 x 2 4 cm  There is an enhancing nodule identified along the   inferior margin of this mass, best seen on series 9  There is no edema identified within the adjacent brain parenchyma surrounding this slow growing mass  This mass is inseparable from the posterior aspect of the optic chiasm and appears to displace   the optic radiation on the right  The mass also displaces the mamillary body inferiorly and is inseparable from the posterior margin of the hypothalamus      Stable encephalomalacia within the right anterior temporal lobe      Normal left hemisphere  Normal brainstem and cerebellum  Diffusion imaging is unremarkable  Postcontrast imaging of the remainder of the brain parenchyma is unremarkable      ORBITS:  Dedicated imaging of the orbits was performed  Normal globes, ocular muscles and optic nerves  Normal orbital apex and preseptal soft tissues      The above described mass is immediately posterior to the right optic radiation  The optic radiation is atrophic    There is enlargement of the right paramedian aspect of the optic chiasm on coronal imaging      VENTRICLES:  Normal      SELLA AND PITUITARY GLAND:  Normal     PARANASAL SINUSES:  Mucosal thickening of the posterior aspect of the right maxillary sinus  Mild mucosal thickening of the ethmoid air cells      VASCULATURE:  Evaluation of the major intracranial vasculature demonstrates appropriate flow voids      CALVARIUM AND SKULL BASE:  Normal      EXTRACRANIAL SOFT TISSUES:  Normal      IMPRESSION:     There is an enlarging complex cystic mass with enhancing mural nodule identified within the medial aspect of the right basal ganglia consistent with patient's history of astrocytoma      Workstation performed: [id number]         I spent 30 minutes directly with the patient during this visit today in which greater than 50% of this time was spent in assessment, examination, impressions, reviewing imagining and recommendations for care  All questions were answered to her/his satisfaction, and contact information provided in the event additional questions arise  Patient acknowledged an understanding and agreement with plan               VIRTUAL VISIT [address]  verbally agrees to participate in [employer]  Pt is aware that [employer] could be limited without vital signs or the ability to perform a full hands-on physical Bryce Lindsey  understands he or the provider may request at any time to terminate the video visit and request the patient to seek care or treatment in person

## 2022-04-29 ENCOUNTER — OFFICE VISIT (OUTPATIENT)
Dept: FAMILY MEDICINE CLINIC | Facility: CLINIC | Age: 32
End: 2022-04-29
Payer: COMMERCIAL

## 2022-04-29 ENCOUNTER — APPOINTMENT (OUTPATIENT)
Dept: LAB | Facility: HOSPITAL | Age: 32
End: 2022-04-29

## 2022-04-29 VITALS
RESPIRATION RATE: 14 BRPM | HEART RATE: 64 BPM | BODY MASS INDEX: 22.85 KG/M2 | SYSTOLIC BLOOD PRESSURE: 122 MMHG | WEIGHT: 142.2 LBS | DIASTOLIC BLOOD PRESSURE: 78 MMHG | OXYGEN SATURATION: 98 % | HEIGHT: 66 IN

## 2022-04-29 DIAGNOSIS — Z00.8 HEALTH EXAMINATION IN POPULATION SURVEY: ICD-10-CM

## 2022-04-29 DIAGNOSIS — Z00.00 ANNUAL PHYSICAL EXAM: Primary | ICD-10-CM

## 2022-04-29 DIAGNOSIS — C72.30 LOW-GRADE OPTIC PATHWAY GLIOMA (HCC): ICD-10-CM

## 2022-04-29 PROBLEM — Z79.899 MEDICAL MARIJUANA USE: Status: ACTIVE | Noted: 2022-04-29

## 2022-04-29 LAB
CHOLEST SERPL-MCNC: 173 MG/DL
EST. AVERAGE GLUCOSE BLD GHB EST-MCNC: 103 MG/DL
HBA1C MFR BLD: 5.2 %
HDLC SERPL-MCNC: 40 MG/DL
LDLC SERPL CALC-MCNC: 123 MG/DL (ref 0–100)
NONHDLC SERPL-MCNC: 133 MG/DL
TRIGL SERPL-MCNC: 51 MG/DL

## 2022-04-29 PROCEDURE — 80061 LIPID PANEL: CPT

## 2022-04-29 PROCEDURE — 83036 HEMOGLOBIN GLYCOSYLATED A1C: CPT

## 2022-04-29 PROCEDURE — 99395 PREV VISIT EST AGE 18-39: CPT | Performed by: FAMILY MEDICINE

## 2022-04-29 PROCEDURE — 36415 COLL VENOUS BLD VENIPUNCTURE: CPT

## 2022-04-29 NOTE — PROGRESS NOTES
[address]    NAME: Merlin Harrell  AGE: 27 y o  SEX: male  : 1990     DATE: 2022     Assessment and Plan:     Problem List Items Addressed This Visit        Nervous and Auditory    Low-grade optic pathway glioma ([address] )     Continues to be followed by neuro surgery  Does use medical marijuana to help control pain  Other Visit Diagnoses     Annual physical exam    -  Primary          Immunizations and preventive care screenings were discussed with patient today  Appropriate education was printed on patient's after visit summary  Counseling:  Alcohol/drug use: discussed moderation in alcohol intake, the recommendations for healthy alcohol use, and avoidance of illicit drug use  Dental Health: discussed importance of regular tooth brushing, flossing, and dental visits  · Exercise: the importance of regular exercise/physical activity was discussed  Recommend exercise 3-5 times per week for at least 30 minutes  Depression Screening and Follow-up Plan: Patient was screened for depression during today's encounter  They screened negative with a PHQ-2 score of 0  Return in about 1 year (around 2023)  Chief Complaint:     Chief Complaint   Patient presents with    Physical Exam      History of Present Illness:     Adult Annual Physical   Patient here for a comprehensive physical exam  The patient reports see list     Diet and Physical Activity  · Diet/Nutrition: well balanced diet, limited junk food and consuming 3-5 servings of fruits/vegetables daily  · Exercise: 5-7 times a week on average        Depression Screening  PHQ-2/9 Depression Screening    Little interest or pleasure in doing things: 0 - not at all  Feeling down, depressed, or hopeless: 0 - not at all  PHQ-2 Score: 0  PHQ-2 Interpretation: Negative depression screen       General Health  · Sleep: sleeps well and gets 7-8 hours of sleep on average  · Hearing: normal - bilateral   · Vision: no vision problems  · Dental: regular dental visits and brushes teeth once daily   Health  · History of STDs?: no      Review of Systems:     Review of Systems   Constitutional: Negative for activity change, appetite change, diaphoresis and fatigue  HENT: Negative for congestion, hearing loss and sinus pressure  Respiratory: Negative for cough, chest tightness, shortness of breath and wheezing  Cardiovascular: Negative for chest pain, palpitations and leg swelling  Fast or slow heart rate   Gastrointestinal: Negative for abdominal pain, blood in stool, constipation, diarrhea, nausea and vomiting  Genitourinary: Negative for difficulty urinating, dysuria, frequency and hematuria  Musculoskeletal: Negative for arthralgias, gait problem, joint swelling and myalgias  Neurological: Positive for headaches  Negative for dizziness and light-headedness  Psychiatric/Behavioral: Negative for agitation, confusion, dysphoric mood and sleep disturbance  The patient is not nervous/anxious         Past Medical History:     Past Medical History:   Diagnosis Date    Brain tumor[facility]toma [address] [address] ) 2001    History of chemotherapy     [facility]roke [facility])       Past Surgical History:     Past Surgical History:   Procedure Laterality Date    ANTERIOR CRUCIATE LIGAMENT REPAIR  2005    APPENDECTOMY  2000    OTHER SURGICAL HISTORY  11/07/2000    [facility] - crani, biopsy / resection       Social History:     Social History     Socioeconomic History    Marital status: /Civil Union     Spouse name: None    Number of children: None    Years of education: None    Highest education level: None   Occupational History    None   Tobacco Use    Smoking status: Never Smoker    Smokeless tobacco: Never Used   Substance and Sexual Activity    Alcohol use: Yes     Comment: occasional    Drug use: Yes     Types: Marijuana    Sexual activity: None   Other Topics Concern    None   Social History Narrative    None     Social Determinants of Health     Financial Resource Strain: Not on file   Food Insecurity: Not on file   Transportation Needs: Not on file   Physical Activity: Not on file   Stress: Not on file   Social Connections: Not on file   Intimate Partner Violence: Not on file   Housing Stability: Not on file      Family History:     Family History   Problem Relation Age of Onset    Emphysema Mother     Lupus Mother     Rheum arthritis Mother     No Known Problems Father     Substance Abuse Neg Hx     Mental illness Neg Hx       Current Medications:     No current outpatient medications on file  No current facility-administered medications for this visit  Allergies: Allergies   Allergen Reactions    Carboplatin Anaphylaxis    Ondansetron Nausea Only     and also nausea ? Physical Exam:     /78   Pulse 64   Resp 14   Ht 5' 6" (1 676 m)   Wt 64 5 kg (142 lb 3 2 oz)   SpO2 98%   BMI 22 95 kg/m²     Physical Exam  Vitals and nursing note reviewed  HENT:      Head: Normocephalic and atraumatic  Right Ear: External ear normal       Left Ear: External ear normal       Nose: Nose normal       Mouth/Throat:      Mouth: Mucous membranes are moist    Eyes:      General:         Right eye: No discharge  Left eye: No discharge  Pupils: Pupils are equal, round, and reactive to light  Neck:      Thyroid: No thyromegaly  Trachea: No tracheal deviation  Cardiovascular:      Rate and Rhythm: Normal rate and regular rhythm  Heart sounds: Normal heart sounds  No murmur heard  Pulmonary:      Effort: Pulmonary effort is normal  No respiratory distress  Breath sounds: Normal breath sounds  No wheezing  Abdominal:      General: Bowel sounds are normal  There is no distension  Palpations: Abdomen is soft  There is no mass  Tenderness:  There is no abdominal tenderness  Musculoskeletal:      Cervical back: Normal range of motion and neck supple  Right lower leg: No edema  Left lower leg: No edema  Lymphadenopathy:      Cervical: No cervical adenopathy  Neurological:      Mental Status: He is alert and oriented to person, place, and time  Cranial Nerves: No cranial nerve deficit        Deep Tendon Reflexes: Reflexes normal    Psychiatric:         Mood and Affect: Mood normal          Behavior: Behavior normal           Nicki Hutchison MD   [address]

## 2022-04-29 NOTE — PATIENT INSTRUCTIONS
Wellness labs drawn this morning  My staff will call with results but they will appear on my chart  Continue follow-up with Neurosurgery as advised with repeat MRI soon  Wellness Visit for Adults   AMBULATORY CARE:   A wellness visit  is when you see your healthcare provider to get screened for health problems  Your healthcare provider will also give you advice on how to stay healthy  Write down your questions so you remember to ask them  Ask your healthcare provider how often you should have a wellness visit  What happens at a wellness visit:  Your healthcare provider will ask about your health, and your family history of health problems  This includes high blood pressure, heart disease, and cancer  He or she will ask if you have symptoms that concern you, if you smoke, and about your mood  You may also be asked about your intake of medicines, supplements, food, and alcohol  Any of the following may be done:  · Your weight  will be checked  Your height may also be checked so your body mass index (BMI) can be calculated  Your BMI shows if you are at a healthy weight  · Your blood pressure  and heart rate will be checked  Your temperature may also be checked  · Blood and urine tests  may be done  Blood tests may be done to check your cholesterol levels  Abnormal cholesterol levels increase your risk for heart disease and stroke  You may also need a blood or urine test to check for diabetes if you are at increased risk  Urine tests may be done to look for signs of an infection or kidney disease  · A physical exam  includes checking your heartbeat and lungs with a stethoscope  Your healthcare provider may also check your skin to look for sun damage  · Screening tests  may be recommended  A screening test is done to check for diseases that may not cause symptoms  The screening tests you may need depend on your age, gender, family history, and lifestyle habits   For example, colorectal screening may be recommended if you are 48years old or older  Screening tests you need if you are a woman:   · A Pap smear  is used to screen for cervical cancer  Pap smears are usually done every 3 to 5 years depending on your age  You may need them more often if you have had abnormal Pap smear test results in the past  Ask your healthcare provider how often you should have a Pap smear  · A mammogram  is an x-ray of your breasts to screen for breast cancer  Experts recommend mammograms every 2 years starting at age 48 years  You may need a mammogram at age 52 years or younger if you have an increased risk for breast cancer  Talk to your healthcare provider about when you should start having mammograms and how often you need them  Vaccines you may need:   · Get an influenza vaccine  every year  The influenza vaccine protects you from the flu  Several types of viruses cause the flu  The viruses change over time, so new vaccines are made each year  · Get a tetanus-diphtheria (Td) booster vaccine  every 10 years  This vaccine protects you against tetanus and diphtheria  Tetanus is a severe infection that may cause painful muscle spasms and lockjaw  Diphtheria is a severe bacterial infection that causes a thick covering in the back of your mouth and throat  · Get a human papillomavirus (HPV) vaccine  if you are female and aged 23 to 32 or male 23 to 24 and never received it  This vaccine protects you from HPV infection  HPV is the most common infection spread by sexual contact  HPV may also cause vaginal, penile, and anal cancers  · Get a pneumococcal vaccine  if you are aged 72 years or older  The pneumococcal vaccine is an injection given to protect you from pneumococcal disease  Pneumococcal disease is an infection caused by pneumococcal bacteria  The infection may cause pneumonia, meningitis, or an ear infection  · Get a shingles vaccine  if you are 60 or older, even if you have had shingles before   The shingles vaccine is an injection to protect you from the varicella-zoster virus  This is the same virus that causes chickenpox  Shingles is a painful rash that develops in people who had chickenpox or have been exposed to the virus  How to eat healthy:  My Plate is a model for planning healthy meals  It shows the types and amounts of foods that should go on your plate  Fruits and vegetables make up about half of your plate, and grains and protein make up the other half  A serving of dairy is included on the side of your plate  The amount of calories and serving sizes you need depends on your age, gender, weight, and height  Examples of healthy foods are listed below:  · Eat a variety of vegetables  such as dark green, red, and orange vegetables  You can also include canned vegetables low in sodium (salt) and frozen vegetables without added butter or sauces  · Eat a variety of fresh fruits , canned fruit in 100% juice, frozen fruit, and dried fruit  · Include whole grains  At least half of the grains you eat should be whole grains  Examples include whole-wheat bread, wheat pasta, brown rice, and whole-grain cereals such as oatmeal     · Eat a variety of protein foods such as seafood (fish and shellfish), lean meat, and poultry without skin (turkey and chicken)  Examples of lean meats include pork leg, shoulder, or tenderloin, and beef round, sirloin, tenderloin, and extra lean ground beef  Other protein foods include eggs and egg substitutes, beans, peas, soy products, nuts, and seeds  · Choose low-fat dairy products such as skim or 1% milk or low-fat yogurt, cheese, and cottage cheese  · Limit unhealthy fats  such as butter, hard margarine, and shortening  Exercise:  Exercise at least 30 minutes per day on most days of the week  Some examples of exercise include walking, biking, dancing, and swimming   You can also fit in more physical activity by taking the stairs instead of the elevator or parking farther away from stores  Include muscle strengthening activities 2 days each week  Regular exercise provides many health benefits  It helps you manage your weight, and decreases your risk for type 2 diabetes, heart disease, stroke, and high blood pressure  Exercise can also help improve your mood  Ask your healthcare provider about the best exercise plan for you  General health and safety guidelines:   · Do not smoke  Nicotine and other chemicals in cigarettes and cigars can cause lung damage  Ask your healthcare provider for information if you currently smoke and need help to quit  E-cigarettes or smokeless tobacco still contain nicotine  Talk to your healthcare provider before you use these products  · Limit alcohol  A drink of alcohol is 12 ounces of beer, 5 ounces of wine, or 1½ ounces of liquor  · Lose weight, if needed  Being overweight increases your risk of certain health conditions  These include heart disease, high blood pressure, type 2 diabetes, and certain types of cancer  · Protect your skin  Do not sunbathe or use tanning beds  Use sunscreen with a SPF 15 or higher  Apply sunscreen at least 15 minutes before you go outside  Reapply sunscreen every 2 hours  Wear protective clothing, hats, and sunglasses when you are outside  · Drive safely  Always wear your seatbelt  Make sure everyone in your car wears a seatbelt  A seatbelt can save your life if you are in an accident  Do not use your cell phone when you are driving  This could distract you and cause an accident  Pull over if you need to make a call or send a text message  · Practice safe sex  Use latex condoms if are sexually active and have more than one partner  Your healthcare provider may recommend screening tests for sexually transmitted infections (STIs)  · Wear helmets, lifejackets, and protective gear    Always wear a helmet when you ride a bike or motorcycle, go skiing, or play sports that could cause a head injury  Wear protective equipment when you play sports  Wear a lifejacket when you are on a boat or doing water sports  © Copyright [employer] 2022 Information is for End User's use only and may not be sold, redistributed or otherwise used for commercial purposes  All illustrations and images included in CareNotes® are the copyrighted property of [employer]  or [facility] Mayda Guido   The above information is an  only  It is not intended as medical advice for individual conditions or treatments  Talk to your doctor, nurse or pharmacist before following any medical regimen to see if it is safe and effective for you

## 2022-05-27 ENCOUNTER — DOCUMENTATION (OUTPATIENT)
Dept: NEUROSURGERY | Facility: CLINIC | Age: 32
End: 2022-05-27

## 2022-05-27 NOTE — PROGRESS NOTES
Peer to peer completed for MRI brain with orbits - APPROVED    MRI brain  Auth #:  [id number]  Exp:  6/25/2022    MRI orbits  Auth #:  [id number]  Exp:  6/25/2022

## 2022-06-17 ENCOUNTER — HOSPITAL ENCOUNTER (OUTPATIENT)
Dept: MRI IMAGING | Facility: HOSPITAL | Age: 32
Discharge: HOME | End: 2022-06-17
Payer: COMMERCIAL

## 2022-06-17 DIAGNOSIS — H53.462 HOMONYMOUS HEMIANOPIA, LEFT: ICD-10-CM

## 2022-06-17 DIAGNOSIS — C72.30 LOW-GRADE OPTIC PATHWAY GLIOMA (HCC): ICD-10-CM

## 2022-06-17 PROCEDURE — G1004 CDSM NDSC: HCPCS

## 2022-06-17 PROCEDURE — A9585 GADOBUTROL INJECTION: HCPCS | Performed by: NURSE PRACTITIONER

## 2022-06-17 PROCEDURE — 70543 MRI ORBT/FAC/NCK W/O &W/DYE: CPT

## 2022-06-17 PROCEDURE — 70553 MRI BRAIN STEM W/O & W/DYE: CPT

## 2022-06-17 RX ADMIN — GADOBUTROL 6 ML: 604.72 INJECTION INTRAVENOUS at 14:18

## 2022-06-24 ENCOUNTER — OFFICE VISIT (OUTPATIENT)
Dept: NEUROSURGERY | Facility: CLINIC | Age: 32
End: 2022-06-24
Payer: COMMERCIAL

## 2022-06-24 VITALS
BODY MASS INDEX: 21.69 KG/M2 | OXYGEN SATURATION: 97 % | SYSTOLIC BLOOD PRESSURE: 116 MMHG | WEIGHT: 135 LBS | HEART RATE: 74 BPM | TEMPERATURE: 97.9 F | DIASTOLIC BLOOD PRESSURE: 80 MMHG | HEIGHT: 66 IN

## 2022-06-24 DIAGNOSIS — C72.30 LOW-GRADE OPTIC PATHWAY GLIOMA (HCC): Primary | ICD-10-CM

## 2022-06-24 PROCEDURE — 99214 OFFICE O/P EST MOD 30 MIN: CPT | Performed by: NURSE PRACTITIONER

## 2022-06-24 RX ORDER — SODIUM FLUORIDE 6.1 MG/ML
PASTE, DENTIFRICE DENTAL
COMMUNITY
Start: 2022-06-19

## 2022-06-24 NOTE — PROGRESS NOTES
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[facility])  Patient seen in outpatient office today for six-month follow-up for history of low grade optic pathway glioma  · Patient with h/o low-grade optic pathway glioma in 2000, this was an incidental finding when patient hit his head on the tire swing and it was seen on imaging  S/p partial surgical resection of mass in 2000 and subsequently underwent chemotherapy in 2001 to 2002 but developed a allergic reaction and chemo was stopped  Imaging:    MRI brain and orbits w/wo 6/17/22:Similar complex cystic suprasellar/hypothalamic mass, consistent with known astrocytoma  Similar-appearing local mass effect  Plan:  · Reviewed imaging with patient and significant other  · Patient will follow-up in 1 year with MRI brain and orbits w w/o for surveillance of low-grade optic pathway glioma or sooner if he develops worsening symptoms  · Recommend patient follow-up with ophthalmologist   · Patient advised if he develops worsening visual changes, worsening headaches, seizures, weakness in arms or legs, and or memory loss to follow-up sooner  · Reviewed recommendations with patient  Patient showed understanding and is agreeable to recommendations  · Patient made aware to contact Neurosurgery with any further questions or concerns  Homonymous hemianopia, left  · Records indicate left homonymous hemianopsia  · As per patient he has a bilateral hemianopsia right and left peripheral visual field cut, reports he is legally blind  Central vision intact    · Patient saw [facility] in December 2021 , report attached in [address]    · See above plan       Diagnoses and all orders for this visit:    Low-grade optic pathway glioma ([facility] [address] )  -     MRI brain and orbits wo and w contrast; Future    Other orders  -     Sodium Fluoride 5000 PPM 1 1 % GEL; USE AT NIGHTTIME IN PLACE OF REGULAR TOOTHPASTE  DO NOT EAT, DRINK OR RINSE AFTERWARDS  CHIEF COMPLAINT    Chief Complaint   Patient presents with    Follow-up     Low-grade optic pathway glioma        HISTORY    History of Present Illness     27y o  year old male with past medical history significant for appendectomy, low-grade optic pathway glioma, and bilateral homonymous hemianopia  Patient seen in outpatient office today for six-month follow-up with updated MRI brain  Patient originally hit his head on a tire swing approximate at the age of 7 or 5 in 5 when an incidental low-grade optic pathway glioma was found on imaging  Patient underwent a partial resection of tumor at [facility] in 2000 with biopsy  Per chart review and patient had a right optic pathway fibrillary astrocytoma  Patient underwent chemotherapy in 2001 until 2002  He states he had to stop chemotherapy secondary to an allergic reaction  Patient was being followed with [facility] with regular MRIs  Per patient the mass was not fully resected  Patient underwent a left frontal temporal craniotomy and biopsy/resection of astrocytoma on 11/07/2000 by Dr Gardner  Per chart review patient was seen in 2012 with complaints of a headache and nausea  He went to the ED, CT scan at that time demonstrated increase in size of tumor cyst compared to prior  Patient then followed up with short interval MRI  Patient was last seen by our service in January with reports of increased frequency of headaches over 6 months  Patient had short interval follow-up with repeat imaging in 6 months  Patient continues to complain of bilateral blurry vision which has been ongoing  He also reports occasional temporal headaches which occur about once a week, patient states taking a nap helps  Denies any changes in his vision  He states blurry vision with looking far which has been ongoing    He states he was last seen by the ophthalmologist in December 2021, per patient he states ophthalmologist said there was nothing to do for his vision  He denies any dizziness, chest pain, shortness of breath, abdominal pain, nausea, vomiting, diarrhea, no problems with bowel or bladder, no new weakness or numbness/tingling  HPI    See Discussion    REVIEW OF SYSTEMS    Review of Systems   Constitutional: Negative  HENT: Negative  Eyes: Negative  Visual disturbance: blurry  Respiratory: Negative  Cardiovascular: Negative  Gastrointestinal: Negative  Endocrine: Positive for cold intolerance (extremity numbness)  Genitourinary: Negative  Musculoskeletal: Negative  Skin: Negative  Allergic/Immunologic: Negative  Neurological: Positive for headaches (occasional, at least once weekly, manageable)  Hematological: Negative  Psychiatric/Behavioral: Negative  All other systems reviewed and are negative  ROS reviewed with patient and agree and changes were made as needed    Meds/Allergies     Current Outpatient Medications   Medication Sig Dispense Refill    Sodium Fluoride 5000 PPM 1 1 % GEL USE AT NIGHTTIME IN PLACE OF REGULAR TOOTHPASTE  DO NOT EAT, DRINK OR RINSE AFTERWARDS  No current facility-administered medications for this visit  Allergies   Allergen Reactions    Carboplatin Anaphylaxis    Ondansetron Nausea Only     and also nausea ?        PAST HISTORY    Past Medical History:   Diagnosis Date    Brain tumor, astrocytoma ([facility] [address] ) 2001    History of chemotherapy     Stroke [facility])        Past Surgical History:   Procedure Laterality Date    ANTERIOR CRUCIATE LIGAMENT REPAIR  2005    APPENDECTOMY  2000    OTHER SURGICAL HISTORY  11/07/2000    [facility] 2000 - crani, biopsy / resection        Social History     Tobacco Use    Smoking status: Never Smoker    Smokeless tobacco: Never Used   Substance Use Topics    Alcohol use: Yes     Comment: occasional    Drug use: Yes     Types: Marijuana       Family History   Problem Relation Age of Onset    Emphysema Mother     Lupus Mother     Rheum arthritis Mother     No Known Problems Father     Substance Abuse Neg Hx     Mental illness Neg Hx          Above history personally reviewed  EXAM    Vitals:Blood pressure 116/80, pulse 74, temperature 97 9 °F (36 6 °C), temperature source Temporal, height 5' 6" (1 676 m), weight 61 2 kg (135 lb), SpO2 97 %  ,Body mass index is 21 79 kg/m²  Physical Exam  Vitals reviewed  Constitutional:       General: He is awake  He is not in acute distress  Appearance: Normal appearance  He is not ill-appearing  HENT:      Head: Normocephalic and atraumatic  Eyes:      Extraocular Movements: Extraocular movements intact and EOM normal       Conjunctiva/sclera: Conjunctivae normal       Comments: Right eye round and reactive to light  Left eye round and sluggish to react to light   Cardiovascular:      Rate and Rhythm: Normal rate  Pulmonary:      Effort: Pulmonary effort is normal  No respiratory distress  Chest:      Chest wall: No tenderness  Abdominal:      General: There is no distension  Palpations: Abdomen is soft  Tenderness: There is no abdominal tenderness  Musculoskeletal:         General: Normal range of motion  Cervical back: Normal range of motion and neck supple  Skin:     General: Skin is warm and dry  Neurological:      Mental Status: He is alert and oriented to person, place, and time  Coordination: Finger-Nose-Finger Test normal       Gait: Gait is intact  Deep Tendon Reflexes: Strength normal       Reflex Scores:       Bicep reflexes are 2+ on the right side and 2+ on the left side  Patellar reflexes are 2+ on the right side and 2+ on the left side  Psychiatric:         Attention and Perception: Attention and perception normal          Mood and Affect: Mood and affect normal          Speech: Speech normal          Behavior: Behavior normal  Behavior is cooperative           Thought Content: Thought content normal          Cognition and Memory: Cognition and memory normal          Judgment: Judgment normal          Neurologic Exam     Mental Status   Oriented to person, place, and time  Follows 2 step commands  Attention: normal  Concentration: normal    Speech: speech is normal   Level of consciousness: alert  Knowledge: good  Able to perform simple calculations  Able to name object  Able to repeat  Normal comprehension  Cranial Nerves     CN II   Right visual field deficit: Ongoing bilateral visual field deficits which are unchanged per patient  CN III, IV, VI   Extraocular motions are normal    CN III: no CN III palsy  CN VI: no CN VI palsy  Nystagmus: none   Diplopia: none  Conjugate gaze: present    CN V   Facial sensation intact  CN VII   Facial expression full, symmetric  CN VIII   CN VIII normal    Hearing: intact    CN IX, X   CN IX normal      CN XI   CN XI normal      CN XII   CN XII normal      Motor Exam   Muscle bulk: normal  Overall muscle tone: normal  Right arm pronator drift: absent  Left arm pronator drift: absent    Strength   Strength 5/5 throughout  Sensory Exam   Light touch normal    Proprioception normal    JPS and DST intact     Gait, Coordination, and Reflexes     Gait  Gait: normal    Coordination   Finger to nose coordination: normal    Tremor   Resting tremor: absent  Intention tremor: absent  Action tremor: absent    Reflexes   Right biceps: 2+  Left biceps: 2+  Right patellar: 2+  Left patellar: 2+  Right [place]: absent  Left [place]: absent  Right ankle clonus: absent  Left ankle clonus: absent        MEDICAL DECISION MAKING    Imaging Studies:     MRI brain and orbits wo and w contrast    Result Date: 6/17/2022  Narrative: MRI OF THE BRAIN AND ORBITS - WITH AND WITHOUT CONTRAST INDICATION: H53 462: Homonymous bilateral field defects, left side C72 30: Malignant neoplasm of unspecified optic nerve   COMPARISON:  December 29, 2021 TECHNIQUE: Brain:  Sagittal T1, axial T2  Axial FLAIR  Axial [place], Axial DWI  Axial T1 post contrast    Axial BRAVO post contrast  Orbits: Coronal T1, coronal fat-suppressed T2  Axial fat-suppressed T2, axial and coronal fat-suppressed T1 postcontrast  IV Contrast:  6 mL of Gadobutrol injection (SINGLE-DOSE) IMAGE QUALITY:  Diagnostic  FINDINGS: BRAIN PARENCHYMA:  Stable complex right suprasellar/parasellar mass contiguous with the right optic tract and lateral chiasm, extending cephalad into the basal ganglia and inferior periventricular soft tissue  The thin rim of hemosiderin, bright signal of the cystic component and enhancement of a mural nodule along the inferolateral aspect of the cyst are unchanged  Cyst approximately 2 7 x 3 2 x 2 8 cm in greatest transverse, cephalocaudad and AP dimension respectively  No edema in the adjacent parenchyma  There is local mass effect on the 3rd and right ventricle overtly stable without evidence for hydrocephalus  Mass inseparable from the hypothalamus and posterior most optic tract  Stable encephalomalacic changes along the anterior aspect of the right temporal lobe likely, reflecting post surgical changes  There are no white matter changes in the cerebral hemispheres  ORBITS:  Globes and optic nerves are normal   Orbital contents normal   Right chiasm and optic tracts are depressed  VENTRICLES:  Normal  SELLA AND PITUITARY GLAND:  Normal PARANASAL SINUSES:  Trace scattered sinus mucosal thickening  VASCULATURE:  Evaluation of the major intracranial vasculature demonstrates appropriate flow voids  CALVARIUM AND SKULL BASE:  Right pterional craniotomy  EXTRACRANIAL SOFT TISSUES:  Normal      Impression: Similar complex cystic suprasellar/hypothalamic mass, consistent with known astrocytoma  Similar-appearing local mass effect  Workstation performed: [id number]       I have personally reviewed pertinent reports     and I have personally reviewed pertinent films in PACS

## 2022-06-26 NOTE — PATIENT INSTRUCTIONS
Patient will follow-up in 1 year with repeat MRI brain or sooner if symptoms worsen    Continue follow-up with Ophthalmology

## 2022-06-26 NOTE — ASSESSMENT & PLAN NOTE
Patient seen in outpatient office today for six-month follow-up for history of low grade optic pathway glioma  · Patient with h/o low-grade optic pathway glioma in 2000, this was an incidental finding when patient hit his head on the tire swing and it was seen on imaging  S/p partial surgical resection of mass in 2000 and subsequently underwent chemotherapy in 2001 to 2002 but developed a allergic reaction and chemo was stopped  Imaging:    MRI brain and orbits w/wo 6/17/22:Similar complex cystic suprasellar/hypothalamic mass, consistent with known astrocytoma  Similar-appearing local mass effect  Plan:  · Reviewed imaging with patient and significant other  · Patient will follow-up in 1 year with MRI brain and orbits w w/o for surveillance of low-grade optic pathway glioma or sooner if he develops worsening symptoms  · Recommend patient follow-up with ophthalmologist   · Patient advised if he develops worsening visual changes, worsening headaches, seizures, weakness in arms or legs, and or memory loss to follow-up sooner  · Reviewed recommendations with patient  Patient showed understanding and is agreeable to recommendations  · Patient made aware to contact Neurosurgery with any further questions or concerns

## 2022-06-26 NOTE — ASSESSMENT & PLAN NOTE
· Records indicate left homonymous hemianopsia  · As per patient he has a bilateral hemianopsia right and left peripheral visual field cut, reports he is legally blind  Central vision intact    · Patient saw [facility] in December 2021 , report attached in [phone]    · See above plan

## 2023-05-12 ENCOUNTER — APPOINTMENT (OUTPATIENT)
Dept: LAB | Facility: HOSPITAL | Age: 33
End: 2023-05-12

## 2023-05-12 ENCOUNTER — OFFICE VISIT (OUTPATIENT)
Dept: FAMILY MEDICINE CLINIC | Facility: CLINIC | Age: 33
End: 2023-05-12

## 2023-05-12 VITALS
DIASTOLIC BLOOD PRESSURE: 78 MMHG | TEMPERATURE: 97.6 F | SYSTOLIC BLOOD PRESSURE: 110 MMHG | HEART RATE: 58 BPM | HEIGHT: 66 IN | OXYGEN SATURATION: 99 % | BODY MASS INDEX: 23.18 KG/M2 | WEIGHT: 144.2 LBS

## 2023-05-12 DIAGNOSIS — Z00.8 HEALTH EXAMINATION IN POPULATION SURVEY: ICD-10-CM

## 2023-05-12 DIAGNOSIS — Z00.00 ANNUAL PHYSICAL EXAM: Primary | ICD-10-CM

## 2023-05-12 LAB
CHOLEST SERPL-MCNC: 168 MG/DL
EST. AVERAGE GLUCOSE BLD GHB EST-MCNC: 103 MG/DL
HBA1C MFR BLD: 5.2 %
HDLC SERPL-MCNC: 53 MG/DL
LDLC SERPL CALC-MCNC: 105 MG/DL (ref 0–100)
NONHDLC SERPL-MCNC: 115 MG/DL
TRIGL SERPL-MCNC: 50 MG/DL

## 2023-05-12 NOTE — PROGRESS NOTES
[address]    NAME: Lourdes Candelaria  AGE: 34 y o  SEX: male  : 1990     DATE: 2023     Assessment and Plan:     Problem List Items Addressed This Visit    None  Visit Diagnoses     Annual physical exam    -  Primary          Immunizations and preventive care screenings were discussed with patient today  Appropriate education was printed on patient's after visit summary  Counseling:  Alcohol/drug use: discussed moderation in alcohol intake, the recommendations for healthy alcohol use, and avoidance of illicit drug use  Dental Health: discussed importance of regular tooth brushing, flossing, and dental visits  · Exercise: the importance of regular exercise/physical activity was discussed  Recommend exercise 3-5 times per week for at least 30 minutes  Depression Screening and Follow-up Plan: Patient was screened for depression during today's encounter  They screened negative with a PHQ-2 score of 0  Return in about 1 year (around 2024)  Chief Complaint:     Chief Complaint   Patient presents with   • Physical Exam      History of Present Illness:     Adult Annual Physical   Patient here for a comprehensive physical exam  The patient reports see list     Diet and Physical Activity  · Diet/Nutrition: well balanced diet, limited junk food and consuming 3-5 servings of fruits/vegetables daily  · Exercise: 5-7 times a week on average  Depression Screening  PHQ-2/9 Depression Screening    Little interest or pleasure in doing things: 0 - not at all  Feeling down, depressed, or hopeless: 0 - not at all  PHQ-2 Score: 0  PHQ-2 Interpretation: Negative depression screen       General Health  · Sleep: sleeps well and gets 7-8 hours of sleep on average  · Hearing: normal - bilateral   · Vision: vision problems: left side field cut     · Dental: regular dental visits, brushes teeth once daily and flosses teeth occasionally   Health  · History of STDs?: no      Review of Systems:     Review of Systems   Constitutional: Negative for activity change, appetite change, diaphoresis and fatigue  HENT: Negative for congestion, sinus pressure and sore throat  Respiratory: Negative for cough, chest tightness, shortness of breath and wheezing  Cardiovascular: Negative for chest pain, palpitations and leg swelling  Fast or slow heart rate   Gastrointestinal: Negative for abdominal pain, blood in stool, constipation, diarrhea, nausea and vomiting  Genitourinary: Negative for difficulty urinating, dysuria, frequency and hematuria  Musculoskeletal: Negative for arthralgias, gait problem, joint swelling and myalgias  Neurological: Negative for dizziness, light-headedness and headaches  Psychiatric/Behavioral: Negative for agitation, confusion, dysphoric mood and sleep disturbance  The patient is not nervous/anxious         Past Medical History:     Past Medical History:   Diagnosis Date   • Brain tumor, astrocytoma ([facility] [address] ) 2001   • History of chemotherapy    • Stroke [facility])       Past Surgical History:     Past Surgical History:   Procedure Laterality Date   • ANTERIOR CRUCIATE LIGAMENT REPAIR  2005   • APPENDECTOMY  2000   • OTHER SURGICAL HISTORY  11/07/2000    [facility] 2000 - crani, biopsy / resection       Social History:     Social History     Socioeconomic History   • Marital status: /Civil Union     Spouse name: None   • Number of children: None   • Years of education: None   • Highest education level: None   Occupational History   • None   Tobacco Use   • Smoking status: Never   • Smokeless tobacco: Never   Substance and Sexual Activity   • Alcohol use: Yes     Comment: occasional   • Drug use: Yes     Types: Marijuana   • Sexual activity: None   Other Topics Concern   • None   Social History Narrative   • None     Social Determinants of Health     Financial Resource Strain: "Not on file   Food Insecurity: Not on file   Transportation Needs: Not on file   Physical Activity: Not on file   Stress: Not on file   Social Connections: Not on file   Intimate Partner Violence: Not on file   Housing Stability: Not on file      Family History:     Family History   Problem Relation Age of Onset   • Emphysema Mother    • Lupus Mother    • Rheum arthritis Mother    • No Known Problems Father    • Substance Abuse Neg Hx    • Mental illness Neg Hx       Current Medications:     Current Outpatient Medications   Medication Sig Dispense Refill   • Sodium Fluoride 5000 PPM 1 1 % GEL USE AT NIGHTTIME IN PLACE OF REGULAR TOOTHPASTE  DO NOT EAT, DRINK OR RINSE AFTERWARDS  No current facility-administered medications for this visit  Allergies: Allergies   Allergen Reactions   • Carboplatin Anaphylaxis   • Ondansetron Nausea Only     and also nausea ? Physical Exam:     /78 (BP Location: Left arm, Patient Position: Sitting, Cuff Size: Standard)   Pulse 58   Temp 97 6 °F (36 4 °C) (Tympanic)   Ht 5' 6\" (1 676 m)   Wt 65 4 kg (144 lb 3 2 oz)   SpO2 99%   BMI 23 27 kg/m²     Physical Exam  Vitals and nursing note reviewed  Constitutional:       General: He is not in acute distress  Appearance: He is not ill-appearing  HENT:      Head: Normocephalic and atraumatic  Right Ear: Tympanic membrane, ear canal and external ear normal       Left Ear: Tympanic membrane, ear canal and external ear normal       Nose: Nose normal       Mouth/Throat:      Mouth: Mucous membranes are moist       Pharynx: No posterior oropharyngeal erythema  Eyes:      General:         Right eye: No discharge  Left eye: No discharge  Extraocular Movements: Extraocular movements intact  Conjunctiva/sclera: Conjunctivae normal       Pupils: Pupils are equal, round, and reactive to light  Neck:      Thyroid: No thyromegaly  Vascular: No carotid bruit        Trachea: No tracheal " deviation  Cardiovascular:      Rate and Rhythm: Normal rate and regular rhythm  Heart sounds: Normal heart sounds  No murmur heard  Pulmonary:      Effort: Pulmonary effort is normal  No respiratory distress  Breath sounds: Normal breath sounds  No wheezing  Abdominal:      General: Bowel sounds are normal  There is no distension  Palpations: Abdomen is soft  There is no mass  Tenderness: There is no abdominal tenderness  There is no guarding  Musculoskeletal:      Cervical back: Normal range of motion and neck supple  Right lower leg: No edema  Left lower leg: No edema  Lymphadenopathy:      Cervical: No cervical adenopathy  Neurological:      General: No focal deficit present  Mental Status: He is alert and oriented to person, place, and time     Psychiatric:         Mood and Affect: Mood normal          Behavior: Behavior normal           Sejal Gee MD   [id number]

## 2023-06-23 ENCOUNTER — HOSPITAL ENCOUNTER (OUTPATIENT)
Dept: MRI IMAGING | Facility: HOSPITAL | Age: 33
End: 2023-06-23
Payer: COMMERCIAL

## 2023-06-23 DIAGNOSIS — C72.30 LOW-GRADE OPTIC PATHWAY GLIOMA (HCC): ICD-10-CM

## 2023-06-23 PROCEDURE — G1004 CDSM NDSC: HCPCS

## 2023-06-23 PROCEDURE — 70553 MRI BRAIN STEM W/O & W/DYE: CPT

## 2023-06-23 PROCEDURE — 70543 MRI ORBT/FAC/NCK W/O &W/DYE: CPT

## 2023-06-23 PROCEDURE — A9585 GADOBUTROL INJECTION: HCPCS | Performed by: NURSE PRACTITIONER

## 2023-06-23 RX ADMIN — GADOBUTROL 7 ML: 604.72 INJECTION INTRAVENOUS at 14:25

## 2023-08-03 NOTE — ASSESSMENT & PLAN NOTE
Patient seen in outpatient office today for 1 year follow-up for history of low grade optic pathway glioma. · Patient with h/o low-grade optic pathway glioma in 2000, this was an incidental finding when patient hit his head on the tire swing  · S/p partial surgical resection of mass in 2000 at [facility] and subsequently underwent chemotherapy in 2001 to 2002 but developed a allergic reaction and chemo was stopped. · At baseline lost left temporal vision after surgery    Imaging:  · MRI brain and orbits w wo contrast 6/23/2023: Similar appearance of complex cystic suprasellar/hypothalamic mass, consistent with known astrocytoma. Similar-appearing local mass effect. Plan:  · Reviewed imaging with patient and wife. Stable findings, compared to imaging dated back to 2012. · Patient will follow-up in 1 year with MRI brain and orbits w w/o for surveillance of low-grade optic pathway glioma or sooner if he develops worsening symptoms. No plans for surgery given nature of this type of tumor and high risk of further visual issues.   · Return to the office sooner should he develop worsening symptoms or red flag signs  · Patient did not request need to see MD today

## 2023-08-04 ENCOUNTER — OFFICE VISIT (OUTPATIENT)
Dept: NEUROSURGERY | Facility: CLINIC | Age: 33
End: 2023-08-04
Payer: COMMERCIAL

## 2023-08-04 ENCOUNTER — TELEPHONE (OUTPATIENT)
Dept: NEUROSURGERY | Facility: CLINIC | Age: 33
End: 2023-08-04

## 2023-08-04 VITALS
HEIGHT: 66 IN | BODY MASS INDEX: 22.98 KG/M2 | DIASTOLIC BLOOD PRESSURE: 82 MMHG | SYSTOLIC BLOOD PRESSURE: 104 MMHG | OXYGEN SATURATION: 98 % | TEMPERATURE: 97.4 F | RESPIRATION RATE: 14 BRPM | HEART RATE: 76 BPM | WEIGHT: 143 LBS

## 2023-08-04 DIAGNOSIS — C72.30 LOW-GRADE OPTIC PATHWAY GLIOMA (HCC): Primary | ICD-10-CM

## 2023-08-04 PROCEDURE — 99214 OFFICE O/P EST MOD 30 MIN: CPT | Performed by: PHYSICIAN ASSISTANT

## 2023-08-04 NOTE — PROGRESS NOTES
Neurosurgery Office Note  Michelle Ha. 34 y.o. male MRN: [id number]      Assessment/Plan     Low-grade optic pathway glioma [facility])  Patient seen in outpatient office today for 1 year follow-up for history of low grade optic pathway glioma. · Patient with h/o low-grade optic pathway glioma in 2000, this was an incidental finding when patient hit his head on the tire swing  · S/p partial surgical resection of mass in 2000 at [facility] and subsequently underwent chemotherapy in 2001 to 2002 but developed a allergic reaction and chemo was stopped. · At baseline lost left temporal vision after surgery    Imaging:  · MRI brain and orbits w wo contrast 6/23/2023: Similar appearance of complex cystic suprasellar/hypothalamic mass, consistent with known astrocytoma. Similar-appearing local mass effect. Plan:  · Reviewed imaging with patient and wife. Stable findings, compared to imaging dated back to 2012. · Patient will follow-up in 1 year with MRI brain and orbits w w/o for surveillance of low-grade optic pathway glioma or sooner if he develops worsening symptoms. No plans for surgery given nature of this type of tumor and high risk of further visual issues. · Return to the office sooner should he develop worsening symptoms or red flag signs  · Patient did not request need to see MD today       Diagnoses and all orders for this visit:    Low-grade optic pathway glioma ([address])  -     MRI brain and orbits wo and w contrast; Future          I have spent a total time of 30 minutes on 08/04/23 in caring for this patient including Diagnostic results, Instructions for management, Impressions, Counseling / Coordination of care, Documenting in the medical record, Reviewing / ordering tests, medicine, procedures   and Obtaining or reviewing history  .       CHIEF COMPLAINT    Chief Complaint   Patient presents with   • Follow-up     1 year follow up for Low-grade optic pathway glioma        HISTORY    History of Present Illness     34y.o. year old male     34year old male with past medical history significant for appendectomy, low-grade optic pathway glioma with residual left temporal VF cut. Patient seen in outpatient office today for 1 year follow-up with updated MRI brain. Patient originally hit his head on a tire swing approximate at the age of 7 or 5 in 5 when an incidental low-grade optic pathway glioma was found on imaging. Patient underwent a left frontal temporal craniotomy for subtotal biopsy/resection of astrocytoma on 11/07/2000 by  at [facility]. Per chart review and patient had a right optic pathway fibrillary astrocytoma. Patient underwent chemotherapy in 2001 until 2002. He states he had to stop chemotherapy secondary to an allergic reaction. Patient was being followed with [facility] with regular MRIs but then aged out. Patient is here with his wife. He states he is doing well. He has blurry vision which is baseline for him as well as loss of vision on the temporal aspect of his left eye. He remains on disability. He has no other complaints today. See Discussion    REVIEW OF SYSTEMS    Review of Systems   Constitutional: Negative. HENT: Negative. Eyes: Negative. Visual disturbance: blurry. Respiratory: Negative. Cardiovascular: Negative. Gastrointestinal: Negative. Endocrine: Positive for cold intolerance (extremity numbness). Genitourinary: Negative. Musculoskeletal: Negative. Skin: Negative. Allergic/Immunologic: Negative. Neurological: Positive for headaches (occasional, at least once weekly, manageable). 1 year follow up for Low-grade optic pathway glioma   6/23/23-mri brain and orbits (scheduled)  [facility] consult notes under media   Hematological: Negative. Psychiatric/Behavioral: Negative. All other systems reviewed and are negative. ROS obtained by MA. Reviewed. See HPI.      Meds/Allergies     Current Outpatient Medications Medication Sig Dispense Refill   • Sodium Fluoride 5000 PPM 1.1 % GEL USE AT NIGHTTIME IN PLACE OF REGULAR TOOTHPASTE. DO NOT EAT, DRINK OR RINSE AFTERWARDS. No current facility-administered medications for this visit. Allergies   Allergen Reactions   • Carboplatin Anaphylaxis   • Ondansetron Nausea Only     and also nausea ? PAST HISTORY    Past Medical History:   Diagnosis Date   • Brain tumor, astrocytoma ([address]) 2001   • History of chemotherapy    • Stroke [facility])        Past Surgical History:   Procedure Laterality Date   • ANTERIOR CRUCIATE LIGAMENT REPAIR  2005   • APPENDECTOMY  2000   • OTHER SURGICAL HISTORY  11/07/2000    [facility] 2000 - crani, biopsy / resection        Social History     Tobacco Use   • Smoking status: Never   • Smokeless tobacco: Never   Substance Use Topics   • Alcohol use: Yes     Comment: occasional   • Drug use: Yes     Types: Marijuana       Family History   Problem Relation Age of Onset   • Emphysema Mother    • Lupus Mother    • Rheum arthritis Mother    • No Known Problems Father    • Substance Abuse Neg Hx    • Mental illness Neg Hx          Above history personally reviewed. EXAM    Vitals:Blood pressure 104/82, pulse 76, temperature (!) 97.4 °F (36.3 °C), temperature source Temporal, resp. rate 14, height 5' 6" (1.676 m), weight 64.9 kg (143 lb), SpO2 98 %. ,Body mass index is 23.08 kg/m². Physical Exam  Constitutional:       General: He is awake. Appearance: Normal appearance. HENT:      Head: Atraumatic. Eyes:      Extraocular Movements: Extraocular movements intact and EOM normal.      Conjunctiva/sclera: Conjunctivae normal.   Cardiovascular:      Rate and Rhythm: Normal rate. Pulmonary:      Effort: Pulmonary effort is normal. No respiratory distress. Skin:     General: Skin is warm and dry. Neurological:      Mental Status: He is alert and oriented to person, place, and time.       Motor: Motor strength is normal.     Coordination: Finger-Nose-Finger Test normal.      Gait: Gait is intact. Psychiatric:         Attention and Perception: Attention and perception normal.         Mood and Affect: Mood and affect normal.         Speech: Speech normal.         Behavior: Behavior normal. Behavior is cooperative. Thought Content: Thought content normal.         Cognition and Memory: Cognition and memory normal.         Judgment: Judgment normal.         Neurologic Exam     Mental Status   Oriented to person, place, and time. Follows 1 step commands. Attention: normal. Concentration: normal.   Speech: speech is normal   Level of consciousness: alert  Knowledge: good. Normal comprehension. Cranial Nerves     CN II   Left visual field deficit: upper temporal and lower temporal quadrant(s)    CN III, IV, VI   Extraocular motions are normal.   CN III: no CN III palsy  CN VI: no CN VI palsy  Nystagmus: none   Diplopia: none  Ophthalmoparesis: none  Upgaze: normal  Downgaze: normal  Conjugate gaze: present    CN V   Right facial sensation deficit: none  Left facial sensation deficit: none    CN VII   Right facial weakness: none  Left facial weakness: none    CN VIII   Hearing: intact    CN IX, X   CN IX normal.   CN X normal.     CN XI   Right trapezius strength: normal  Left trapezius strength: normal    CN XII   CN XII normal.     Motor Exam   Muscle bulk: normal  Overall muscle tone: normal  Right arm pronator drift: absent  Left arm pronator drift: absent    Strength   Strength 5/5 throughout. Sensory Exam   Light touch normal.     Gait, Coordination, and Reflexes     Gait  Gait: normal    Coordination   Finger to nose coordination: normal    Tremor   Resting tremor: absent  Intention tremor: absent  Action tremor: absent    Reflexes   Right : 2+  Left : 2+        MEDICAL DECISION MAKING    Imaging Studies:     No results found. I have personally reviewed pertinent reports.    and I have personally reviewed pertinent films in PACS

## 2023-08-04 NOTE — TELEPHONE ENCOUNTER
8/4/23:  Return in about 1 year (around 8/4/2024). Per Jennifer Mejia WITH BRAIN DOC.   PT WOULD LIKE HDM  WIFE WILL CALL TO SCHEDULE MRI BRAIN THEN CALL OFFICE BACK TO SCHEDULE FOLLOW UP.

## 2024-03-26 DIAGNOSIS — Z00.6 ENCOUNTER FOR EXAMINATION FOR NORMAL COMPARISON OR CONTROL IN CLINICAL RESEARCH PROGRAM: ICD-10-CM

## 2024-03-29 ENCOUNTER — APPOINTMENT (OUTPATIENT)
Dept: LAB | Facility: HOSPITAL | Age: 34
End: 2024-03-29

## 2024-03-29 DIAGNOSIS — Z00.6 ENCOUNTER FOR EXAMINATION FOR NORMAL COMPARISON OR CONTROL IN CLINICAL RESEARCH PROGRAM: ICD-10-CM

## 2024-03-29 DIAGNOSIS — Z00.8 HEALTH EXAMINATION IN POPULATION SURVEY: ICD-10-CM

## 2024-03-29 LAB
CHOLEST SERPL-MCNC: 200 MG/DL
EST. AVERAGE GLUCOSE BLD GHB EST-MCNC: 105 MG/DL
HBA1C MFR BLD: 5.3 %
HDLC SERPL-MCNC: 44 MG/DL
LDLC SERPL CALC-MCNC: 139 MG/DL (ref 0–100)
NONHDLC SERPL-MCNC: 156 MG/DL
TRIGL SERPL-MCNC: 86 MG/DL

## 2024-03-29 PROCEDURE — 36415 COLL VENOUS BLD VENIPUNCTURE: CPT

## 2024-03-29 PROCEDURE — 80061 LIPID PANEL: CPT

## 2024-03-29 PROCEDURE — 83036 HEMOGLOBIN GLYCOSYLATED A1C: CPT

## 2024-04-16 LAB
APOB+LDLR+PCSK9 GENE MUT ANL BLD/T: NOT DETECTED
BRCA1+BRCA2 DEL+DUP + FULL MUT ANL BLD/T: NOT DETECTED
MLH1+MSH2+MSH6+PMS2 GN DEL+DUP+FUL M: NOT DETECTED

## 2024-05-17 ENCOUNTER — OFFICE VISIT (OUTPATIENT)
Dept: FAMILY MEDICINE CLINIC | Facility: CLINIC | Age: 34
End: 2024-05-17
Payer: COMMERCIAL

## 2024-05-17 VITALS
SYSTOLIC BLOOD PRESSURE: 110 MMHG | DIASTOLIC BLOOD PRESSURE: 60 MMHG | OXYGEN SATURATION: 98 % | TEMPERATURE: 96.8 F | HEART RATE: 93 BPM | RESPIRATION RATE: 18 BRPM | WEIGHT: 149 LBS | HEIGHT: 66 IN | BODY MASS INDEX: 23.95 KG/M2

## 2024-05-17 DIAGNOSIS — Z00.00 ANNUAL PHYSICAL EXAM: Primary | ICD-10-CM

## 2024-05-17 DIAGNOSIS — C72.30 LOW-GRADE OPTIC PATHWAY GLIOMA (HCC): ICD-10-CM

## 2024-05-17 PROCEDURE — 99395 PREV VISIT EST AGE 18-39: CPT | Performed by: NURSE PRACTITIONER

## 2024-05-17 NOTE — PROGRESS NOTES
Adult Annual Physical  Name: Margarito Hall Jr.      : 1990      MRN: [id number]  Encounter Provider: CHAPARRO Najera  Encounter Date: 2024   Encounter department: [facility]    Assessment & Plan   1. Annual physical exam  -labs reviewed. Return 1 year for physical    2. Low-grade optic pathway glioma (HCC)  -condition stable. Follows with neurology.    Immunizations and preventive care screenings were discussed with patient today. Appropriate education was printed on patient's after visit summary.    Counseling:  Alcohol/drug use: discussed moderation in alcohol intake, the recommendations for healthy alcohol use, and avoidance of illicit drug use.  Dental Health: discussed importance of regular tooth brushing, flossing, and dental visits.  Injury prevention: discussed safety/seat belts, safety helmets, smoke detectors, carbon dioxide detectors, and smoking near bedding or upholstery.  Sexual health: discussed sexually transmitted diseases, partner selection, use of condoms, avoidance of unintended pregnancy, and contraceptive alternatives.  Exercise: the importance of regular exercise/physical activity was discussed. Recommend exercise 3-5 times per week for at least 30 minutes.       Depression Screening and Follow-up Plan: Patient was screened for depression during today's encounter. They screened negative with a PHQ-2 score of 0.        History of Present Illness     Adult Annual Physical:  Patient presents for annual physical.     Diet and Physical Activity:  - Diet/Nutrition: well balanced diet.  - Exercise: 3-4 times a week on average.    Depression Screening:  - PHQ-2 Score: 0    General Health:  - Sleep: sleeps well.  - Hearing: normal hearing bilateral ears.  - Vision: goes for regular eye exams.  - Dental: regular dental visits.     Health:  - History of STDs: no.   - Urinary symptoms: none.     Advanced Care Planning:  - Has an advanced  directive?: no    - Has a durable medical POA?: no    - ACP document given to patient?: yes      Review of Systems   Constitutional:  Negative for activity change, appetite change, chills, diaphoresis, fatigue and fever.   HENT:  Negative for congestion, dental problem, drooling, ear discharge, ear pain, facial swelling, hearing loss, mouth sores, nosebleeds, postnasal drip, rhinorrhea, sinus pressure, sinus pain, sneezing, sore throat, tinnitus, trouble swallowing and voice change.    Eyes:  Negative for photophobia, pain, discharge, redness, itching and visual disturbance.   Respiratory:  Negative for apnea, cough, choking, chest tightness, shortness of breath, wheezing and stridor.    Cardiovascular:  Negative for chest pain, palpitations and leg swelling.   Gastrointestinal:  Negative for abdominal distention, abdominal pain, anal bleeding, blood in stool, constipation, diarrhea, nausea and vomiting.   Endocrine: Negative for cold intolerance, heat intolerance, polydipsia, polyphagia and polyuria.   Genitourinary:  Negative for decreased urine volume, difficulty urinating, dysuria, enuresis, flank pain, frequency, genital sores, hematuria, penile discharge, penile pain, penile swelling, scrotal swelling, testicular pain and urgency.   Musculoskeletal:  Negative for arthralgias, back pain, gait problem, joint swelling, myalgias, neck pain and neck stiffness.   Skin:  Negative for color change, rash and wound.   Neurological:  Negative for dizziness, tremors, seizures, syncope, facial asymmetry, speech difficulty, weakness, light-headedness, numbness and headaches.   Hematological:  Negative for adenopathy. Does not bruise/bleed easily.   Psychiatric/Behavioral:  Negative for agitation, behavioral problems, confusion, decreased concentration, dysphoric mood, hallucinations, sleep disturbance and suicidal ideas. The patient is not nervous/anxious and is not hyperactive.          Objective     /60 (BP  "Location: Left arm, Patient Position: Sitting, Cuff Size: Adult)   Pulse 93   Temp (!) 96.8 °F (36 °C) (Tympanic)   Resp 18   Ht 5' 6\" (1.676 m)   Wt 67.6 kg (149 lb)   SpO2 98%   BMI 24.05 kg/m²     Physical Exam  Vitals and nursing note reviewed.   Constitutional:       General: He is not in acute distress.     Appearance: Normal appearance. He is well-developed and normal weight. He is not ill-appearing, toxic-appearing or diaphoretic.   HENT:      Head: Normocephalic and atraumatic.      Right Ear: Tympanic membrane, ear canal and external ear normal. There is no impacted cerumen.      Left Ear: Tympanic membrane, ear canal and external ear normal. There is no impacted cerumen.      Nose: Nose normal. No congestion or rhinorrhea.      Mouth/Throat:      Mouth: Mucous membranes are moist.      Pharynx: Oropharynx is clear. No oropharyngeal exudate or posterior oropharyngeal erythema.   Eyes:      General:         Right eye: No discharge.         Left eye: No discharge.      Extraocular Movements: Extraocular movements intact.      Conjunctiva/sclera: Conjunctivae normal.      Pupils: Pupils are equal, round, and reactive to light.   Neck:      Vascular: No carotid bruit.   Cardiovascular:      Rate and Rhythm: Normal rate and regular rhythm.      Heart sounds: Normal heart sounds. No murmur heard.     No friction rub. No gallop.   Pulmonary:      Effort: Pulmonary effort is normal. No respiratory distress.      Breath sounds: Normal breath sounds. No stridor. No wheezing, rhonchi or rales.   Chest:      Chest wall: No tenderness.   Abdominal:      General: Bowel sounds are normal. There is no distension.      Palpations: Abdomen is soft. There is no mass.      Tenderness: There is no abdominal tenderness. There is no right CVA tenderness, left CVA tenderness, guarding or rebound.      Hernia: No hernia is present.   Musculoskeletal:         General: No swelling, tenderness, deformity or signs of injury.    "   Cervical back: Normal range of motion and neck supple. No rigidity or tenderness.      Right lower leg: No edema.      Left lower leg: No edema.   Lymphadenopathy:      Cervical: No cervical adenopathy.   Skin:     General: Skin is warm and dry.      Capillary Refill: Capillary refill takes less than 2 seconds.      Coloration: Skin is not jaundiced or pale.      Findings: No bruising, erythema, lesion or rash.   Neurological:      General: No focal deficit present.      Mental Status: He is alert and oriented to person, place, and time.      Cranial Nerves: No cranial nerve deficit.      Sensory: No sensory deficit.      Motor: No weakness.      Coordination: Coordination normal.      Gait: Gait normal.      Deep Tendon Reflexes: Reflexes normal.   Psychiatric:         Mood and Affect: Mood normal.         Behavior: Behavior normal.         Thought Content: Thought content normal.       Administrative Statements

## 2024-08-04 ENCOUNTER — HOSPITAL ENCOUNTER (OUTPATIENT)
Dept: MRI IMAGING | Facility: HOSPITAL | Age: 34
Discharge: HOME | End: 2024-08-04
Payer: COMMERCIAL

## 2024-08-04 DIAGNOSIS — C72.30 LOW-GRADE OPTIC PATHWAY GLIOMA (HCC): ICD-10-CM

## 2024-08-04 PROCEDURE — 70543 MRI ORBT/FAC/NCK W/O &W/DYE: CPT

## 2024-08-04 PROCEDURE — A9585 GADOBUTROL INJECTION: HCPCS | Performed by: PHYSICIAN ASSISTANT

## 2024-08-04 PROCEDURE — 70553 MRI BRAIN STEM W/O & W/DYE: CPT

## 2024-08-04 PROCEDURE — G1004 CDSM NDSC: HCPCS

## 2024-08-04 RX ORDER — GADOBUTROL 604.72 MG/ML
7 INJECTION INTRAVENOUS
Status: COMPLETED | OUTPATIENT
Start: 2024-08-04 | End: 2024-08-04

## 2024-08-04 RX ADMIN — GADOBUTROL 7 ML: 604.72 INJECTION INTRAVENOUS at 10:59

## 2024-08-12 ENCOUNTER — OFFICE VISIT (OUTPATIENT)
Dept: FAMILY MEDICINE CLINIC | Facility: CLINIC | Age: 34
End: 2024-08-12
Payer: COMMERCIAL

## 2024-08-12 VITALS
HEART RATE: 95 BPM | BODY MASS INDEX: 23.4 KG/M2 | WEIGHT: 145 LBS | SYSTOLIC BLOOD PRESSURE: 128 MMHG | DIASTOLIC BLOOD PRESSURE: 83 MMHG | OXYGEN SATURATION: 97 % | TEMPERATURE: 97 F

## 2024-08-12 DIAGNOSIS — T63.441A BEE STING REACTION, ACCIDENTAL OR UNINTENTIONAL, INITIAL ENCOUNTER: Primary | ICD-10-CM

## 2024-08-12 PROCEDURE — 99213 OFFICE O/P EST LOW 20 MIN: CPT | Performed by: NURSE PRACTITIONER

## 2024-08-12 NOTE — PATIENT INSTRUCTIONS
Keep affected area clean, Apply cool compresses to affected area to decrease swelling/itching.  Apply hydrocortisone cream as directed.  Take antihistamine as directed.

## 2024-08-12 NOTE — ASSESSMENT & PLAN NOTE
Localized erythema surrounding site. Mild redness and itching. Denies shortness of breath/wheezing. States improved from yesterday. Instructed to watch for signs of infection. Instructed to take antihistamine and continue ice/cool compresses/hydrocortisone cream to decrease swelling/itching.

## 2024-08-12 NOTE — PROGRESS NOTES
Ambulatory Visit  Name: Margarito Hall Jr.      : 1990      MRN: [id number]  Encounter Provider: CHAPARRO Najera  Encounter Date: 2024   Encounter department: [facility]    Assessment & Plan   1. Bee sting reaction, accidental or unintentional, initial encounter  Assessment & Plan:  Localized erythema surrounding site. Mild redness and itching. Denies shortness of breath/wheezing. States improved from yesterday. Instructed to watch for signs of infection. Instructed to take antihistamine and continue ice/cool compresses/hydrocortisone cream to decrease swelling/itching.       History of Present Illness     Stung by a bee yesterday. Ashok shortness of breath, no wheezing. He was stung on abdomen he says it has improved. States his wife and mom made him come in. He states when he gets stung or bit by insect the area will initially swell but he has never had any breathing issues afterwards.     Insect Bite  This is a new problem. The current episode started yesterday. The problem has been gradually improving. Pertinent negatives include no abdominal pain, anorexia, arthralgias, chest pain, chills, congestion, coughing, diaphoresis, fatigue, fever, headaches, joint swelling, myalgias, nausea, neck pain, numbness, sore throat, swollen glands, urinary symptoms, vertigo, visual change, vomiting or weakness. Associated symptoms comments: itching. Nothing aggravates the symptoms. Treatments tried: antihistamine, cool compresses. The treatment provided moderate relief.       Review of Systems   Constitutional:  Negative for chills, diaphoresis, fatigue and fever.   HENT:  Negative for congestion and sore throat.    Respiratory:  Negative for cough.    Cardiovascular:  Negative for chest pain.   Gastrointestinal:  Negative for abdominal pain, anorexia, nausea and vomiting.   Musculoskeletal:  Negative for arthralgias, joint swelling, myalgias and neck pain.   Neurological:   Negative for vertigo, weakness, numbness and headaches.       Objective     /83 (BP Location: Left arm, Patient Position: Sitting, Cuff Size: Standard)   Pulse 95   Temp (!) 97 °F (36.1 °C) (Tympanic)   Wt 65.8 kg (145 lb)   SpO2 97%   BMI 23.40 kg/m²     Physical Exam  Vitals and nursing note reviewed.   Constitutional:       General: He is not in acute distress.     Appearance: Normal appearance. He is not ill-appearing or diaphoretic.   HENT:      Head: Normocephalic.   Eyes:      Extraocular Movements: Extraocular movements intact.   Cardiovascular:      Rate and Rhythm: Normal rate and regular rhythm.      Heart sounds: Normal heart sounds.   Pulmonary:      Effort: Pulmonary effort is normal. No respiratory distress.      Breath sounds: Normal breath sounds. No wheezing.   Skin:     General: Skin is warm and dry.      Findings: Erythema present.      Comments: Localized erythema left abdomen surrounding site of bee sting. No infection noted.   Neurological:      Mental Status: He is alert and oriented to person, place, and time.   Psychiatric:         Mood and Affect: Mood normal.         Behavior: Behavior normal.

## 2024-08-21 NOTE — ASSESSMENT & PLAN NOTE
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  Patient with h/o low-grade optic pathway glioma in 2000, incidentally found when patient hit his head on the tire swing  S/p partial surgical resection of mass in 2000 at [facility] and subsequently underwent chemotherapy in 2001 to 2002 but developed a allergic reaction and chemo was stopped.  No new complaints today  Exam: Residual left homonymous hemianopsia, otherwise nonfocal    Imaging:  MRI brain/orbits 8/4/24: 1. Stable predominantly cystic lesion involving the suprasellar region/hypothalamus, likely involving the right optic tract, and abutting the lateral aspect of the right optic chiasm, with a stable enhancing mural nodule along its inferolateral margin compatible with the patient's known astrocytoma. 2. Stable mass effect on the third ventricle, and right foramen of Monro. The ventricles are unchanged in size. 3. Orbits otherwise appear normal. 4. No acute infarct. No new intracranial enhancing lesion is identified    Plan:  Reviewed imaging with patient and wife.  Stable appearance to cystic lesion as well as enhancing nodule laterally.  No neurosurgical invention recommended.  Continue annual surveillance per NCCN guidelines.  Reviewed red flag signs and symptoms.  Follow-up in 1 year with MRI brain and orbits w w/o for surveillance of low-grade optic pathway glioma or sooner if he develops worsening symptoms.    Call sooner with any questions or concerns.

## 2024-08-23 ENCOUNTER — OFFICE VISIT (OUTPATIENT)
Dept: NEUROSURGERY | Facility: CLINIC | Age: 34
End: 2024-08-23
Payer: COMMERCIAL

## 2024-08-23 VITALS
RESPIRATION RATE: 18 BRPM | WEIGHT: 145 LBS | HEIGHT: 66 IN | BODY MASS INDEX: 23.3 KG/M2 | TEMPERATURE: 97.3 F | DIASTOLIC BLOOD PRESSURE: 72 MMHG | HEART RATE: 69 BPM | SYSTOLIC BLOOD PRESSURE: 124 MMHG | OXYGEN SATURATION: 98 %

## 2024-08-23 DIAGNOSIS — C72.30 LOW-GRADE OPTIC PATHWAY GLIOMA (HCC): Primary | ICD-10-CM

## 2024-08-23 PROCEDURE — 99214 OFFICE O/P EST MOD 30 MIN: CPT | Performed by: PHYSICIAN ASSISTANT

## 2024-08-23 NOTE — PROGRESS NOTES
Neurosurgery Office Note  Margarito Hall Jr. 33 y.o. 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  Patient with h/o low-grade optic pathway glioma in 2000, incidentally found when patient hit his head on the tire swing  S/p partial surgical resection of mass in 2000 at [facility] and subsequently underwent chemotherapy in 2001 to 2002 but developed a allergic reaction and chemo was stopped.  No new complaints today  Exam: Residual left homonymous hemianopsia, otherwise nonfocal    Imaging:  MRI brain/orbits 8/4/24: 1. Stable predominantly cystic lesion involving the suprasellar region/hypothalamus, likely involving the right optic tract, and abutting the lateral aspect of the right optic chiasm, with a stable enhancing mural nodule along its inferolateral margin compatible with the patient's known astrocytoma. 2. Stable mass effect on the third ventricle, and right foramen of Monro. The ventricles are unchanged in size. 3. Orbits otherwise appear normal. 4. No acute infarct. No new intracranial enhancing lesion is identified    Plan:  Reviewed imaging with patient and wife.  Stable appearance to cystic lesion as well as enhancing nodule laterally.  No neurosurgical invention recommended.  Continue annual surveillance per NCCN guidelines.  Reviewed red flag signs and symptoms.  Follow-up in 1 year with MRI brain and orbits w w/o for surveillance of low-grade optic pathway glioma or sooner if he develops worsening symptoms.    Call sooner with any questions or concerns.     Diagnoses and all orders for this visit:    Low-grade optic pathway glioma (HCC)  -     BUN; Future  -     Creatinine, serum; Future  -     MRI brain and orbits wo and w contrast; Future          I have spent a total time of 30 minutes on 08/23/24 in caring for this patient including Diagnostic results, Instructions for management, Patient and family  education, Impressions, Counseling / Coordination of care, Documenting in the medical record, Reviewing / ordering tests, medicine, procedures  , Obtaining or reviewing history  , and Communicating with other healthcare professionals .      CHIEF COMPLAINT    Chief Complaint   Patient presents with    Follow-up     MRI 8/4/24  1 year follow up for Low-grade optic pathway glioma          HISTORY    History of Present Illness     33 y.o. year old male     33 year old male returns for 1 year follow-up of a low-grade optic pathway glioma. Patient originally hit his head on a tire swing approximately at the age of 9 or 10 in 2000 when an incidental low-grade optic pathway glioma was found on imaging.  He underwent a left frontal temporal craniotomy for subtotal biopsy/resection of astrocytoma 11/7/20 by Dr. Gardner at [facility].  Per chart review and patient had a right optic pathway fibrillary astrocytoma.  Patient underwent chemotherapy in 2001 until 2002.  He states he had to stop chemotherapy secondary to an allergic reaction.  Patient was being followed with [facility] with regular MRIs but then aged out. Patient is here with his wife.  He states he is doing well.  Patient has a residual left visual field cut in both eyes.  Notes occasional headaches, otherwise no new complaints for visit today.          See Discussion    REVIEW OF SYSTEMS    Review of Systems   Constitutional:  Negative for fatigue.   HENT:  Negative for tinnitus.    Eyes: Negative.    Respiratory: Negative.     Cardiovascular: Negative.    Gastrointestinal:  Negative for diarrhea and vomiting.   Endocrine: Negative.    Genitourinary: Negative.    Musculoskeletal: Negative.    Skin: Negative.    Allergic/Immunologic: Negative.    Neurological:  Positive for headaches (occassionally). Negative for weakness and numbness.   Hematological: Negative.    Psychiatric/Behavioral:  Negative for sleep disturbance.        ROS obtained by MA. Reviewed. See HPI.  "    Meds/Allergies     Current Outpatient Medications   Medication Sig Dispense Refill    Sodium Fluoride 5000 PPM 1.1 % GEL USE AT NIGHTTIME IN PLACE OF REGULAR TOOTHPASTE. DO NOT EAT, DRINK OR RINSE AFTERWARDS.       No current facility-administered medications for this visit.       Allergies   Allergen Reactions    Carboplatin Anaphylaxis    Ondansetron Nausea Only     and also nausea ?       PAST HISTORY    Past Medical History:   Diagnosis Date    Arthritis Aug 2021    I have it in my neck.    Brain tumor, astrocytoma (HCC) 2001    Headache(784.0) Every few days    History of chemotherapy     Stroke (HCC)        Past Surgical History:   Procedure Laterality Date    ANTERIOR CRUCIATE LIGAMENT REPAIR  2005    APPENDECTOMY  2000    BRAIN SURGERY  2001    Miod pathway glioma on my optic nerve    OTHER SURGICAL HISTORY  11/07/2000    [facility] 2000 - crani, biopsy / resection        Social History     Tobacco Use    Smoking status: Never    Smokeless tobacco: Never   Vaping Use    Vaping status: Never Used   Substance Use Topics    Alcohol use: Yes     Alcohol/week: 1.0 standard drink of alcohol     Types: 1 Standard drinks or equivalent per week     Comment: occasional    Drug use: Yes     Frequency: 7.0 times per week     Types: Marijuana     Comment: I have my medical marijuana card       Family History   Problem Relation Age of Onset    Emphysema Mother     Lupus Mother     Rheum arthritis Mother     Depression Mother     Coronary artery disease Mother     Thyroid disease Mother     COPD Mother         rheumatoid arthritis    No Known Problems Father     Substance Abuse Neg Hx     Mental illness Neg Hx          Above history personally reviewed.       EXAM    Vitals:Blood pressure 124/72, pulse 69, temperature (!) 97.3 °F (36.3 °C), temperature source Temporal, resp. rate 18, height 5' 6\" (1.676 m), weight 65.8 kg (145 lb), SpO2 98%.,Body mass index is 23.4 kg/m².     Physical Exam  Vitals reviewed.   Constitutional: "       General: He is awake.      Appearance: Normal appearance.   HENT:      Head: Normocephalic and atraumatic.   Eyes:      Extraocular Movements: EOM normal.      Conjunctiva/sclera: Conjunctivae normal.      Pupils: Pupils are equal, round, and reactive to light.   Cardiovascular:      Rate and Rhythm: Normal rate.   Pulmonary:      Effort: Pulmonary effort is normal.   Skin:     General: Skin is warm and dry.   Neurological:      Mental Status: He is alert and oriented to person, place, and time.      Motor: Motor strength is normal.     Coordination: Finger-Nose-Finger Test normal.      Gait: Gait is intact.      Deep Tendon Reflexes:      Reflex Scores:       Bicep reflexes are 2+ on the right side and 2+ on the left side.       Brachioradialis reflexes are 2+ on the right side and 2+ on the left side.       Patellar reflexes are 2+ on the right side and 2+ on the left side.  Psychiatric:         Attention and Perception: Attention and perception normal.         Mood and Affect: Mood and affect normal.         Speech: Speech normal.         Behavior: Behavior normal. Behavior is cooperative.         Thought Content: Thought content normal.         Cognition and Memory: Cognition and memory normal.         Judgment: Judgment normal.         Neurologic Exam     Mental Status   Oriented to person, place, and time.   Oriented to year and month.   Follows 2 step commands.   Attention: normal. Concentration: normal.   Speech: speech is normal   Level of consciousness: alert  Knowledge: good. Able to perform simple calculations.   Able to name object. Normal comprehension.     Cranial Nerves     CN II   Right visual field deficit: upper nasal and lower nasal quadrant(s)  Left visual field deficit: upper temporal and lower temporal quadrant(s)    CN III, IV, VI   Pupils are equal, round, and reactive to light.  Extraocular motions are normal.   Right pupil: Shape: regular. Reactivity: brisk. Consensual response:  intact.   Left pupil: Shape: regular. Reactivity: brisk. Consensual response: intact.   CN III: no CN III palsy  CN VI: no CN VI palsy  Nystagmus: none   Ophthalmoparesis: none  Upgaze: normal  Downgaze: normal  Conjugate gaze: present    CN V   Facial sensation intact.     CN VII   Facial expression full, symmetric.     CN VIII   Hearing: intact    CN XI   Right trapezius strength: normal  Left trapezius strength: normal    CN XII   CN XII normal.     Motor Exam   Muscle bulk: normal  Overall muscle tone: normal  Right arm pronator drift: absent  Left arm pronator drift: absent    Strength   Strength 5/5 throughout.     Sensory Exam   Light touch normal.     Gait, Coordination, and Reflexes     Gait  Gait: normal    Coordination   Finger to nose coordination: normal    Tremor   Resting tremor: absent  Intention tremor: absent  Action tremor: absent    Reflexes   Right brachioradialis: 2+  Left brachioradialis: 2+  Right biceps: 2+  Left biceps: 2+  Right patellar: 2+  Left patellar: 2+  Right [place]: absent  Left [place]: absent        MEDICAL DECISION MAKING    Imaging Studies:     MRI brain and orbits wo and w contrast    Result Date: 8/6/2024  Narrative: MRI OF THE BRAIN AND ORBITS - WITH AND WITHOUT CONTRAST INDICATION: Malignant neoplasm of unspecified optic nerve. COMPARISON: Brain MR from 6/23/2023, 6/17/2022, 12/29/2021, 11/25/2020, prior head CT from 11/7/2016 TECHNIQUE: Multiplanar, multisequence imaging of the brain and orbits was performed before and after gadolinium administration. IV Contrast:  7 mL of Gadobutrol injection (SINGLE-DOSE) IMAGE QUALITY:  Diagnostic. FINDINGS: BRAIN PARENCHYMA: There is no acute infarct. There is a stable predominantly cystic lesion, involving the right suprasellar/parasellar region, which appears to involve the right optic track and the lateral aspect of the optic chiasm on the right, and which has an enhancing mural nodule, along its inferolateral margins. The lesion  measures approximately 3.2 cm x 2.4 cm in maximal axial dimensions, by approximately 2.6 cm craniocaudally. The enhancing mural nodule component measures approximately 1.3 cm anteroposteriorly, by approximately 0.7 cm transversely, by approximately 0.5 cm craniocaudally on series 11 image 12, and series 12 image 14. There is a rim of hemosiderin along the margin of the lesion, stable. There is again mass effect on the anterior aspect of the third ventricle, and the right foramen of Monro. The mass is again inseparable from the hypothalamus and posterior optic tract. No significant surrounding vasogenic edema is noted. There is a small area of encephalomalacia involving the anterior right temporal lobe, likely related to postsurgical changes. No new intracranial enhancing lesions identified. ORBITS:  Normal globes.  Normal ocular muscles. Optic nerves appear normal. Normal cavernous sinuses.  Preseptal and retrobulbar soft tissues are normal. VENTRICLES: There is again mass effect by the lesion on the anterior aspect of the third ventricle and the right foramen of Monro, with stable asymmetric dilatation of the right lateral ventricle compared to the left. SELLA AND PITUITARY GLAND: The pituitary gland is normal. The cavernous sinuses appear normal. PARANASAL SINUSES:  Normal. VASCULATURE:  Evaluation of the major intracranial vasculature demonstrates appropriate flow voids. CALVARIUM AND SKULL BASE:  Normal. EXTRACRANIAL SOFT TISSUES:  Normal.     Impression: 1. Stable predominantly cystic lesion involving the suprasellar region/hypothalamus, likely involving the right optic tract, and abutting the lateral aspect of the right optic chiasm, with a stable enhancing mural nodule along its inferolateral margin compatible with the patient's known astrocytoma. 2. Stable mass effect on the third ventricle, and right foramen of Monro. The ventricles are unchanged in size. 3. Orbits otherwise appear normal. 4. No acute  infarct. No new intracranial enhancing lesion is identified. Workstation performed: [id number]       I have personally reviewed pertinent reports.   and I have personally reviewed pertinent films in PACS

## 2024-10-18 ENCOUNTER — TELEPHONE (OUTPATIENT)
Dept: GASTROENTEROLOGY | Facility: CLINIC | Age: 34
End: 2024-10-18

## 2024-10-18 ENCOUNTER — OFFICE VISIT (OUTPATIENT)
Dept: GASTROENTEROLOGY | Facility: CLINIC | Age: 34
End: 2024-10-18
Payer: COMMERCIAL

## 2024-10-18 VITALS
SYSTOLIC BLOOD PRESSURE: 140 MMHG | DIASTOLIC BLOOD PRESSURE: 90 MMHG | WEIGHT: 141 LBS | HEIGHT: 66 IN | BODY MASS INDEX: 22.66 KG/M2

## 2024-10-18 DIAGNOSIS — C72.30 LOW-GRADE OPTIC PATHWAY GLIOMA (HCC): ICD-10-CM

## 2024-10-18 DIAGNOSIS — R13.10 ODYNOPHAGIA: ICD-10-CM

## 2024-10-18 DIAGNOSIS — K21.9 GASTROESOPHAGEAL REFLUX DISEASE, UNSPECIFIED WHETHER ESOPHAGITIS PRESENT: Primary | ICD-10-CM

## 2024-10-18 PROCEDURE — 99204 OFFICE O/P NEW MOD 45 MIN: CPT | Performed by: STUDENT IN AN ORGANIZED HEALTH CARE EDUCATION/TRAINING PROGRAM

## 2024-10-18 NOTE — H&P (VIEW-ONLY)
Consultation - [facility]     Margarito Hall Jr. 33 y.o. male MRN: [id number]  Unit/Bed#:  Encounter: [id number]    Consults    ASSESSMENT and PLAN    1. Gastroesophageal reflux disease, unspecified whether esophagitis present  History of acid reflux which has been progressively worsening now with some odynophagia.  Discussed recommendation for EGD to rule out mucosal disease such as erosions, ulcerations, Cho's esophagus and eosinophilic esophagitis.  Hold on PPI until procedure is done so as to not mask any underlying pathology.  Once EGD is performed, can then discuss starting PPI.  - EGD; Future    2. Odynophagia  As above, proceed with EGD.  Procedure, preparation, risk and benefit discussed in detail.  Patient is amenable with proceeding.    3. Low-grade optic pathway glioma (HCC)  Stable, follows with [facility].      Chief Complaint   Patient presents with    Heartburn     When pt swallows food his throat burns, just when he is eating, also pain in his back, burping and that relieves pain at times         HPI  Margarito Hall Jr. is a 33 y.o. year old male with a past medical history of low-grade optic pathway glioma followed at [facility], status post surgical resection 2000, stable who presents for evaluation of heartburn which has been progressively worsening over years.  He will have pain in his mid back when he swallows from time to time, no specific foods.  Will go with Tums and then come back.  He is getting more consistent recently.  He has occasional regurgitation.  No nausea, vomiting, obstructive symptoms.  He is not on a PPI.  No NSAID use or tobacco use.      Historical Information   Past Medical History:   Diagnosis Date    Arthritis Aug 2021    I have it in my neck.    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    OTHER SURGICAL HISTORY  11/07/2000    [facility] 2000 - crani, biopsy / resection      Social History   Social History     Substance and Sexual Activity   Alcohol Use Yes    Alcohol/week: 1.0 standard drink of alcohol    Types: 1 Standard drinks or equivalent per week    Comment: occasional     Social History     Substance and Sexual Activity   Drug Use Yes    Frequency: 7.0 times per week    Types: Marijuana    Comment: I have my medical marijuana card     Social History     Tobacco Use   Smoking Status Never   Smokeless Tobacco Never     Family History   Problem Relation Age of Onset    Emphysema Mother     Lupus Mother     Rheum arthritis Mother     Depression Mother     Coronary artery disease Mother     Thyroid disease Mother     COPD Mother         rheumatoid arthritis    No Known Problems Father     Substance Abuse Neg Hx     Mental illness Neg Hx        Meds/Allergies     No current facility-administered medications for this visit.  Not in a hospital admission.    Allergies   Allergen Reactions    Carboplatin Anaphylaxis    Ondansetron Nausea Only     and also nausea ?       PHYSICAL EXAM    Visit Vitals  /90   Ht 5' 6\" (1.676 m)   Wt 64 kg (141 lb)   BMI 22.76 kg/m²   Smoking Status Never   BSA 1.72 m²     Body mass index is 22.76 kg/m².  General Appearance: NAD, cooperative, alert  Eyes: Anicteric, EOMI  ENT: Normocephalic, atraumatic, normal mucosa  Neck: symmetrical, trachea midline  Lungs: clear to auscultation, non-labored respirations on room air, no wheezing  CV: S1 S2+ radial pulses bilaterally  GI:  Soft, non-tender, non-distended; normal bowel sounds; no masses, no organomegaly   Rectal: Deferred  Musculoskeletal: No gross deformities or pitting edema  Skin:  No jaundice, no rashes  Neurologic: awake, alert, oriented, no gross deficits    Lab Results   Component Value Date    BUN 16 11/19/2020    " "CREATININE 1.02 11/19/2020     No results found for: \"WBC\", \"HGB\", \"MCV\", \"PLT\"  No results found for: \"ALT\", \"AST\", \"GGT\", \"ALKPHOS\", \"TBILI\"  No results found for: \"AMYLASE\"  No results found for: \"LIPASE\"  No results found for: \"IRON\", \"TIBC\", \"FERRITIN\"  No results found for: \"INR\"    No results found.    Imaging Studies: I have personally reviewed pertinent reports.      Pathology, and Other Studies: I have personally reviewed pertinent reports.      REVIEW OF SYSTEMS    CONSTITUTIONAL: negative unless stated in HPI  GASTROINTESTINAL: As noted in the HPI        "

## 2024-10-18 NOTE — PROGRESS NOTES
Consultation - Formerly [facility], [facility]     Margarito Hall Jr. 33 y.o. male MRN: [id number]  Unit/Bed#:  Encounter: [id number]    Consults    ASSESSMENT and PLAN    1. Gastroesophageal reflux disease, unspecified whether esophagitis present  History of acid reflux which has been progressively worsening now with some odynophagia.  Discussed recommendation for EGD to rule out mucosal disease such as erosions, ulcerations, Cho's esophagus and eosinophilic esophagitis.  Hold on PPI until procedure is done so as to not mask any underlying pathology.  Once EGD is performed, can then discuss starting PPI.  - EGD; Future    2. Odynophagia  As above, proceed with EGD.  Procedure, preparation, risk and benefit discussed in detail.  Patient is amenable with proceeding.    3. Low-grade optic pathway glioma (HCC)  Stable, follows with [facility].      Chief Complaint   Patient presents with    Heartburn     When pt swallows food his throat burns, just when he is eating, also pain in his back, burping and that relieves pain at times         HPI  Margarito Hall Jr. is a 33 y.o. year old male with a past medical history of low-grade optic pathway glioma followed at [facility], status post surgical resection 2000, stable who presents for evaluation of heartburn which has been progressively worsening over years.  He will have pain in his mid back when he swallows from time to time, no specific foods.  Will go with Tums and then come back.  He is getting more consistent recently.  He has occasional regurgitation.  No nausea, vomiting, obstructive symptoms.  He is not on a PPI.  No NSAID use or tobacco use.      Historical Information   Past Medical History:   Diagnosis Date    Arthritis Aug 2021    I have it in my neck.    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    OTHER SURGICAL HISTORY  11/07/2000    [facility] 2000 - crani, biopsy / resection      Social History   Social History     Substance and Sexual Activity   Alcohol Use Yes    Alcohol/week: 1.0 standard drink of alcohol    Types: 1 Standard drinks or equivalent per week    Comment: occasional     Social History     Substance and Sexual Activity   Drug Use Yes    Frequency: 7.0 times per week    Types: Marijuana    Comment: I have my medical marijuana card     Social History     Tobacco Use   Smoking Status Never   Smokeless Tobacco Never     Family History   Problem Relation Age of Onset    Emphysema Mother     Lupus Mother     Rheum arthritis Mother     Depression Mother     Coronary artery disease Mother     Thyroid disease Mother     COPD Mother         rheumatoid arthritis    No Known Problems Father     Substance Abuse Neg Hx     Mental illness Neg Hx        Meds/Allergies     No current facility-administered medications for this visit.  Not in a hospital admission.    Allergies   Allergen Reactions    Carboplatin Anaphylaxis    Ondansetron Nausea Only     and also nausea ?       PHYSICAL EXAM    Visit Vitals  /90   Ht 5' 6\" (1.676 m)   Wt 64 kg (141 lb)   BMI 22.76 kg/m²   Smoking Status Never   BSA 1.72 m²     Body mass index is 22.76 kg/m².  General Appearance: NAD, cooperative, alert  Eyes: Anicteric, EOMI  ENT: Normocephalic, atraumatic, normal mucosa  Neck: symmetrical, trachea midline  Lungs: clear to auscultation, non-labored respirations on room air, no wheezing  CV: S1 S2+ radial pulses bilaterally  GI:  Soft, non-tender, non-distended; normal bowel sounds; no masses, no organomegaly   Rectal: Deferred  Musculoskeletal: No gross deformities or pitting edema  Skin:  No jaundice, no rashes  Neurologic: awake, alert, oriented, no gross deficits    Lab Results   Component Value Date    BUN 16 11/19/2020    " "CREATININE 1.02 11/19/2020     No results found for: \"WBC\", \"HGB\", \"MCV\", \"PLT\"  No results found for: \"ALT\", \"AST\", \"GGT\", \"ALKPHOS\", \"TBILI\"  No results found for: \"AMYLASE\"  No results found for: \"LIPASE\"  No results found for: \"IRON\", \"TIBC\", \"FERRITIN\"  No results found for: \"INR\"    No results found.    Imaging Studies: I have personally reviewed pertinent reports.      Pathology, and Other Studies: I have personally reviewed pertinent reports.      REVIEW OF SYSTEMS    CONSTITUTIONAL: negative unless stated in HPI  GASTROINTESTINAL: As noted in the HPI        "

## 2024-10-18 NOTE — TELEPHONE ENCOUNTER
Scheduled date of EGD(as of today):11/15/24  Physician performing EGD:Caesar  Location of EGD:[facility]  Instructions reviewed with patient by:SAL  Clearances: NONE

## 2024-11-01 ENCOUNTER — ANESTHESIA (OUTPATIENT)
Dept: ANESTHESIOLOGY | Facility: AMBULATORY SURGERY CENTER | Age: 34
End: 2024-11-01

## 2024-11-01 ENCOUNTER — ANESTHESIA EVENT (OUTPATIENT)
Dept: ANESTHESIOLOGY | Facility: AMBULATORY SURGERY CENTER | Age: 34
End: 2024-11-01

## 2024-11-04 ENCOUNTER — TELEPHONE (OUTPATIENT)
Dept: GASTROENTEROLOGY | Facility: CLINIC | Age: 34
End: 2024-11-04

## 2024-11-15 ENCOUNTER — ANESTHESIA EVENT (OUTPATIENT)
Dept: GASTROENTEROLOGY | Facility: AMBULATORY SURGERY CENTER | Age: 34
End: 2024-11-15

## 2024-11-15 ENCOUNTER — HOSPITAL ENCOUNTER (OUTPATIENT)
Dept: GASTROENTEROLOGY | Facility: AMBULATORY SURGERY CENTER | Age: 34
Discharge: HOME | End: 2024-11-15
Attending: STUDENT IN AN ORGANIZED HEALTH CARE EDUCATION/TRAINING PROGRAM
Payer: COMMERCIAL

## 2024-11-15 ENCOUNTER — ANESTHESIA (OUTPATIENT)
Dept: GASTROENTEROLOGY | Facility: AMBULATORY SURGERY CENTER | Age: 34
End: 2024-11-15

## 2024-11-15 VITALS
TEMPERATURE: 97 F | RESPIRATION RATE: 20 BRPM | HEART RATE: 74 BPM | WEIGHT: 141 LBS | OXYGEN SATURATION: 97 % | DIASTOLIC BLOOD PRESSURE: 70 MMHG | BODY MASS INDEX: 22.66 KG/M2 | SYSTOLIC BLOOD PRESSURE: 115 MMHG | HEIGHT: 66 IN

## 2024-11-15 DIAGNOSIS — K21.9 GASTROESOPHAGEAL REFLUX DISEASE, UNSPECIFIED WHETHER ESOPHAGITIS PRESENT: ICD-10-CM

## 2024-11-15 DIAGNOSIS — K22.10 EROSIVE ESOPHAGITIS: Primary | ICD-10-CM

## 2024-11-15 PROCEDURE — 88305 TISSUE EXAM BY PATHOLOGIST: CPT | Performed by: PATHOLOGY

## 2024-11-15 PROCEDURE — 43239 EGD BIOPSY SINGLE/MULTIPLE: CPT | Performed by: STUDENT IN AN ORGANIZED HEALTH CARE EDUCATION/TRAINING PROGRAM

## 2024-11-15 PROCEDURE — 88342 IMHCHEM/IMCYTCHM 1ST ANTB: CPT | Performed by: PATHOLOGY

## 2024-11-15 RX ORDER — ESOMEPRAZOLE MAGNESIUM 40 MG/1
40 CAPSULE, DELAYED RELEASE ORAL DAILY
Qty: 90 CAPSULE | Refills: 0 | Status: SHIPPED | OUTPATIENT
Start: 2024-11-15 | End: 2024-11-22 | Stop reason: SINTOL

## 2024-11-15 RX ORDER — SODIUM CHLORIDE, SODIUM LACTATE, POTASSIUM CHLORIDE, CALCIUM CHLORIDE 600; 310; 30; 20 MG/100ML; MG/100ML; MG/100ML; MG/100ML
50 INJECTION, SOLUTION INTRAVENOUS CONTINUOUS
Status: DISCONTINUED | OUTPATIENT
Start: 2024-11-15 | End: 2024-11-19 | Stop reason: HOSPADM

## 2024-11-15 RX ORDER — PROPOFOL 10 MG/ML
INJECTION, EMULSION INTRAVENOUS AS NEEDED
Status: DISCONTINUED | OUTPATIENT
Start: 2024-11-15 | End: 2024-11-15

## 2024-11-15 RX ADMIN — SODIUM CHLORIDE, SODIUM LACTATE, POTASSIUM CHLORIDE, CALCIUM CHLORIDE 50 ML/HR: 600; 310; 30; 20 INJECTION, SOLUTION INTRAVENOUS at 07:41

## 2024-11-15 RX ADMIN — SODIUM CHLORIDE, SODIUM LACTATE, POTASSIUM CHLORIDE, CALCIUM CHLORIDE: 600; 310; 30; 20 INJECTION, SOLUTION INTRAVENOUS at 08:02

## 2024-11-15 RX ADMIN — PROPOFOL 100 MG: 10 INJECTION, EMULSION INTRAVENOUS at 07:55

## 2024-11-15 RX ADMIN — PROPOFOL 50 MG: 10 INJECTION, EMULSION INTRAVENOUS at 07:59

## 2024-11-15 RX ADMIN — PROPOFOL 100 MG: 10 INJECTION, EMULSION INTRAVENOUS at 07:56

## 2024-11-15 NOTE — ANESTHESIA PREPROCEDURE EVALUATION
Procedure:  EGD    Relevant Problems   ANESTHESIA (within normal limits)      CARDIO (within normal limits)      GI/HEPATIC   (+) Gastroesophageal reflux disease      PULMONARY   (-) Sleep apnea   (-) URI (upper respiratory infection)      Behavioral Health   (+) Medical marijuana use (daily)      Oncology   (+) Low-grade optic pathway glioma (HCC) (Childhood hx, s/p surgery/chemo)      Physical Exam    Airway    Mallampati score: I  TM Distance: >3 FB  Neck ROM: full     Dental   No notable dental hx     Cardiovascular      Pulmonary      Other Findings      Lab Results   Component Value Date    HGBA1C 5.3 03/29/2024     Anesthesia Plan  ASA Score- 2     Anesthesia Type- IV sedation with anesthesia with ASA Monitors.         Additional Monitors:     Airway Plan:            Plan Factors-Exercise tolerance (METS): >4 METS.    Chart reviewed.   Existing labs reviewed. Patient summary reviewed.    Patient is a current smoker ([id number]).              Induction- intravenous.    Postoperative Plan-         Informed Consent- Anesthetic plan and risks discussed with patient.  I personally reviewed this patient with the CRNA. Discussed and agreed on the Anesthesia Plan with the CRNA..

## 2024-11-15 NOTE — INTERVAL H&P NOTE
H&P reviewed. After examining the patient I find no changes in the patients condition since the H&P had been written.    Vitals:    11/15/24 0733   BP: 144/70   Pulse: 76   Resp: 22   Temp: (!) 97 °F (36.1 °C)   SpO2: 93%

## 2024-11-15 NOTE — ANESTHESIA POSTPROCEDURE EVALUATION
Post-Op Assessment Note    CV Status:  Stable  Pain Score: 0    Pain management: adequate       Mental Status:  Alert and awake   Hydration Status:  Euvolemic   PONV Controlled:  Controlled   Airway Patency:  Patent     Post Op Vitals Reviewed: Yes    No anethesia notable event occurred.    Staff: CRNA           Last Filed PACU Vitals:  Vitals Value Taken Time   Temp 98 803   Pulse 110    /85    Resp 16    SpO2 98        Modified Sushma:  Activity: 2 (11/15/2024  7:29 AM)  Respiration: 2 (11/15/2024  7:29 AM)  Circulation: 2 (11/15/2024  7:29 AM)  Consciousness: 2 (11/15/2024  7:29 AM)  Oxygen Saturation: 2 (11/15/2024  7:29 AM)  Modified Sushma Score: 10 (11/15/2024  7:29 AM)

## 2024-11-20 ENCOUNTER — HOSPITAL ENCOUNTER (EMERGENCY)
Facility: HOSPITAL | Age: 34
Discharge: HOME | End: 2024-11-20
Attending: EMERGENCY MEDICINE
Payer: COMMERCIAL

## 2024-11-20 VITALS
OXYGEN SATURATION: 96 % | SYSTOLIC BLOOD PRESSURE: 115 MMHG | DIASTOLIC BLOOD PRESSURE: 75 MMHG | HEART RATE: 80 BPM | TEMPERATURE: 97.7 F | RESPIRATION RATE: 19 BRPM

## 2024-11-20 DIAGNOSIS — R22.0 FACIAL SWELLING: Primary | ICD-10-CM

## 2024-11-20 DIAGNOSIS — T78.40XA ALLERGIC REACTION, INITIAL ENCOUNTER: ICD-10-CM

## 2024-11-20 PROCEDURE — 99283 EMERGENCY DEPT VISIT LOW MDM: CPT

## 2024-11-20 PROCEDURE — 96372 THER/PROPH/DIAG INJ SC/IM: CPT

## 2024-11-20 PROCEDURE — 99284 EMERGENCY DEPT VISIT MOD MDM: CPT | Performed by: EMERGENCY MEDICINE

## 2024-11-20 RX ORDER — FAMOTIDINE 20 MG/1
20 TABLET, FILM COATED ORAL ONCE
Status: COMPLETED | OUTPATIENT
Start: 2024-11-20 | End: 2024-11-20

## 2024-11-20 RX ORDER — EPINEPHRINE 1 MG/ML
0.3 INJECTION, SOLUTION, CONCENTRATE INTRAVENOUS ONCE
Status: COMPLETED | OUTPATIENT
Start: 2024-11-20 | End: 2024-11-20

## 2024-11-20 RX ORDER — EPINEPHRINE 0.3 MG/.3ML
0.3 INJECTION SUBCUTANEOUS ONCE
Qty: 0.6 ML | Refills: 0 | Status: SHIPPED | OUTPATIENT
Start: 2024-11-20 | End: 2024-11-20

## 2024-11-20 RX ORDER — PREDNISONE 20 MG/1
40 TABLET ORAL DAILY
Qty: 8 TABLET | Refills: 0 | Status: SHIPPED | OUTPATIENT
Start: 2024-11-20 | End: 2024-11-24

## 2024-11-20 RX ORDER — PREDNISONE 20 MG/1
40 TABLET ORAL ONCE
Status: COMPLETED | OUTPATIENT
Start: 2024-11-20 | End: 2024-11-20

## 2024-11-20 RX ADMIN — PREDNISONE 40 MG: 20 TABLET ORAL at 21:02

## 2024-11-20 RX ADMIN — EPINEPHRINE 0.3 MG: 1 INJECTION, SOLUTION, CONCENTRATE INTRAVENOUS at 21:02

## 2024-11-20 RX ADMIN — FAMOTIDINE 20 MG: 20 TABLET, FILM COATED ORAL at 21:02

## 2024-11-21 PROCEDURE — 88342 IMHCHEM/IMCYTCHM 1ST ANTB: CPT | Performed by: PATHOLOGY

## 2024-11-21 PROCEDURE — 88305 TISSUE EXAM BY PATHOLOGIST: CPT | Performed by: PATHOLOGY

## 2024-11-21 NOTE — ED PROVIDER NOTES
Time reflects when diagnosis was documented in both MDM as applicable and the Disposition within this note       Time User Action Codes Description Comment    11/20/2024 11:25 PM Jl Parker [R22.0] Facial swelling     11/20/2024 11:25 PM Jl Parker [T78.40XA] Allergic reaction, initial encounter           ED Disposition       ED Disposition   Discharge    Condition   Stable    Date/Time   Wed Nov 20, 2024 11:25 PM    Comment   Margarito Hall Jr. discharge to home/self care.                   Assessment & Plan       Medical Decision Making  33-year-old male presenting with facial swelling consistent with allergic reaction.  Supportive care with epinephrine, steroids and antihistamines.  Close observation.    Upon reassessment, patient with significant improvement.  Provided with prescriptions.  Return precautions discussed.    Risk  Prescription drug management.        ED Course as of 11/21/24 0047   Wed Nov 20, 2024   2324 Patient with significant improvement of swelling after a few hours of observation       Medications   EPINEPHrine PF (ADRENALIN) 1 mg/mL injection 0.3 mg (0.3 mg Intramuscular Given 11/20/24 2102)   famotidine (PEPCID) tablet 20 mg (20 mg Oral Given 11/20/24 2102)   predniSONE tablet 40 mg (40 mg Oral Given 11/20/24 2102)       ED Risk Strat Scores                           SBIRT 22yo+      Flowsheet Row Most Recent Value   Initial Alcohol Screen: US AUDIT-C     1. How often do you have a drink containing alcohol? 0 Filed at: 11/20/2024 2020   2. How many drinks containing alcohol do you have on a typical day you are drinking?  0 Filed at: 11/20/2024 2020   3a. Male UNDER 65: How often do you have five or more drinks on one occasion? 0 Filed at: 11/20/2024 2020   3b. FEMALE Any Age, or MALE 65+: How often do you have 4 or more drinks on one occassion? 0 Filed at: 11/20/2024 2020   Audit-C Score 0 Filed at: 11/20/2024 2020   GAVIN: How many times in the past year have you...    Used an  illegal drug or used a prescription medication for non-medical reasons? Never Filed at: 11/20/2024 2020                            History of Present Illness       Chief Complaint   Patient presents with    Facial Swelling     Pt reports facial swelling that started a half hour ago, pt Denises sob or trouble breathing, pt reports she started nexium Friday, pt reports he put eye drops in eye on way over        Past Medical History:   Diagnosis Date    Arthritis Aug 2021    I have it in my neck.    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    OTHER SURGICAL HISTORY  11/07/2000    [facility] 2000 - crani, biopsy / resection       Family History   Problem Relation Age of Onset    Emphysema Mother     Lupus Mother     Rheum arthritis Mother     Depression Mother     Coronary artery disease Mother     Thyroid disease Mother     COPD Mother         rheumatoid arthritis    No Known Problems Father     Substance Abuse Neg Hx     Mental illness Neg Hx       Social History     Tobacco Use    Smoking status: Never    Smokeless tobacco: Never   Vaping Use    Vaping status: Never Used   Substance Use Topics    Alcohol use: Yes     Alcohol/week: 1.0 standard drink of alcohol     Types: 1 Standard drinks or equivalent per week     Comment: occasional    Drug use: Yes     Frequency: 7.0 times per week     Types: Marijuana     Comment: I have my medical marijuana card      E-Cigarette/Vaping    E-Cigarette Use Never User       E-Cigarette/Vaping Substances      I have reviewed and agree with the history as documented.     33-year-old male presents for evaluation of sudden onset facial swelling mostly periorbital shortly prior to arrival.  Patient is uncertain of specific triggering or  inciting factors.  Patient states he started Nexium a few days ago.  Denies eating anything out of the ordinary.  No known specific food allergies.  Denies any other symptoms including shortness of breath, wheezing, vomiting or rash.  Patient reports taking 2 tablets of Benadryl shortly prior to arrival.        Review of Systems   Constitutional:  Negative for fever.           Objective       ED Triage Vitals   Temperature Pulse Blood Pressure Respirations SpO2 Patient Position - Orthostatic VS   11/20/24 2019 11/20/24 2019 11/20/24 2019 11/20/24 2019 11/20/24 2019 --   97.7 °F (36.5 °C) 69 142/93 18 98 %       Temp Source Heart Rate Source BP Location FiO2 (%) Pain Score    11/20/24 2019 11/20/24 2019 11/20/24 2205 -- --    Temporal Monitor Right arm        Vitals      Date and Time Temp Pulse SpO2 Resp BP Pain Score FACES Pain Rating User   11/20/24 2300 -- 80 96 % 19 115/75 -- -- AD   11/20/24 2205 -- 85 96 % 19 127/72 -- -- AD   11/20/24 2030 -- 87 99 % 18 136/94 -- -- RN   11/20/24 2019 97.7 °F (36.5 °C) 69 98 % 18 142/93 -- -- CK            Physical Exam  Vitals and nursing note reviewed.   Constitutional:       General: He is not in acute distress.     Appearance: He is well-developed.   HENT:      Head: Normocephalic and atraumatic.      Comments: Facial swelling most prominent around periorbital region and forehead.  Significantly improved per patient and compared to picture patient provided.     Right Ear: External ear normal.      Left Ear: External ear normal.      Nose: Nose normal.   Eyes:      General: No scleral icterus.     Extraocular Movements: Extraocular movements intact.      Pupils: Pupils are equal, round, and reactive to light.   Pulmonary:      Effort: Pulmonary effort is normal. No respiratory distress.      Breath sounds: No wheezing.   Abdominal:      General: There is no distension.      Palpations: Abdomen is soft.   Musculoskeletal:         General: No deformity. Normal range of  motion.      Cervical back: Normal range of motion and neck supple.   Skin:     General: Skin is warm.      Findings: No rash.   Neurological:      General: No focal deficit present.      Mental Status: He is alert.      Gait: Gait normal.   Psychiatric:         Mood and Affect: Mood normal.         Results Reviewed       None            No orders to display       Procedures    ED Medication and Procedure Management   Prior to Admission Medications   Prescriptions Last Dose Informant Patient Reported? Taking?   Sodium Fluoride 5000 PPM 1.1 % GEL  Self Yes No   Sig: USE AT NIGHTTIME IN PLACE OF REGULAR TOOTHPASTE. DO NOT EAT, DRINK OR RINSE AFTERWARDS.   esomeprazole (NexIUM) 40 MG capsule   No No   Sig: Take 1 capsule (40 mg total) by mouth in the morning      Facility-Administered Medications: None     Discharge Medication List as of 11/20/2024 11:25 PM        START taking these medications    Details   EPINEPHrine (EPIPEN) 0.3 mg/0.3 mL SOAJ Inject 0.3 mL (0.3 mg total) into a muscle once for 1 dose, Starting Wed 11/20/2024, Normal      predniSONE 20 mg tablet Take 2 tablets (40 mg total) by mouth daily for 4 days, Starting Wed 11/20/2024, Until Sun 11/24/2024, Normal           CONTINUE these medications which have NOT CHANGED    Details   esomeprazole (NexIUM) 40 MG capsule Take 1 capsule (40 mg total) by mouth in the morning, Starting Fri 11/15/2024, Until Thu 2/13/2025, Normal      Sodium Fluoride 5000 PPM 1.1 % GEL USE AT NIGHTTIME IN PLACE OF REGULAR TOOTHPASTE. DO NOT EAT, DRINK OR RINSE AFTERWARDS., Historical Med           No discharge procedures on file.  ED SEPSIS DOCUMENTATION   Time reflects when diagnosis was documented in both MDM as applicable and the Disposition within this note       Time User Action Codes Description Comment    11/20/2024 11:25 PM Jl Parker [R22.0] Facial swelling     11/20/2024 11:25 PM Jl Parker [T78.40XA] Allergic reaction, initial encounter                   Jl Parker,   11/21/24 0047

## 2024-11-22 ENCOUNTER — RESULTS FOLLOW-UP (OUTPATIENT)
Dept: GASTROENTEROLOGY | Facility: CLINIC | Age: 34
End: 2024-11-22

## 2024-11-22 DIAGNOSIS — K22.10 EROSIVE ESOPHAGITIS: Primary | ICD-10-CM

## 2024-11-22 RX ORDER — FAMOTIDINE 40 MG/1
40 TABLET, FILM COATED ORAL DAILY
Qty: 30 TABLET | Refills: 1 | Status: SHIPPED | OUTPATIENT
Start: 2024-11-22 | End: 2025-01-21

## 2024-11-22 NOTE — PROGRESS NOTES
Still with some facial swelling. Uncertain if esomeprazole is culprit or not. Change to Pepcid. Esomeprazole added to allergy list just in case.

## 2024-12-23 DIAGNOSIS — K22.10 EROSIVE ESOPHAGITIS: ICD-10-CM

## 2024-12-24 RX ORDER — FAMOTIDINE 40 MG/1
40 TABLET, FILM COATED ORAL DAILY
Qty: 90 TABLET | Refills: 1 | Status: SHIPPED | OUTPATIENT
Start: 2024-12-24

## 2024-12-26 DIAGNOSIS — K21.9 GASTROESOPHAGEAL REFLUX DISEASE, UNSPECIFIED WHETHER ESOPHAGITIS PRESENT: ICD-10-CM

## 2024-12-26 DIAGNOSIS — K21.9 GASTROESOPHAGEAL REFLUX DISEASE, UNSPECIFIED WHETHER ESOPHAGITIS PRESENT: Primary | ICD-10-CM

## 2024-12-26 RX ORDER — OMEPRAZOLE 40 MG/1
40 CAPSULE, DELAYED RELEASE ORAL DAILY
Qty: 90 CAPSULE | Refills: 0 | Status: SHIPPED | OUTPATIENT
Start: 2024-12-26 | End: 2025-03-26

## 2024-12-26 RX ORDER — OMEPRAZOLE 40 MG/1
40 CAPSULE, DELAYED RELEASE ORAL DAILY
Qty: 90 CAPSULE | Refills: 0 | Status: SHIPPED | OUTPATIENT
Start: 2024-12-26 | End: 2024-12-26 | Stop reason: SDUPTHER

## 2025-01-08 ENCOUNTER — OFFICE VISIT (OUTPATIENT)
Dept: FAMILY MEDICINE CLINIC | Facility: CLINIC | Age: 35
End: 2025-01-08
Payer: COMMERCIAL

## 2025-01-08 VITALS — HEART RATE: 100 BPM | BODY MASS INDEX: 23.57 KG/M2 | WEIGHT: 146 LBS | OXYGEN SATURATION: 98 %

## 2025-01-08 DIAGNOSIS — R05.1 ACUTE COUGH: ICD-10-CM

## 2025-01-08 DIAGNOSIS — U07.1 POSITIVE SELF-ADMINISTERED ANTIGEN TEST FOR COVID-19: Primary | ICD-10-CM

## 2025-01-08 PROCEDURE — 99213 OFFICE O/P EST LOW 20 MIN: CPT | Performed by: NURSE PRACTITIONER

## 2025-01-08 RX ORDER — DEXTROMETHORPHAN HYDROBROMIDE AND PROMETHAZINE HYDROCHLORIDE 15; 6.25 MG/5ML; MG/5ML
5 SYRUP ORAL 4 TIMES DAILY PRN
Qty: 180 ML | Refills: 0 | Status: SHIPPED | OUTPATIENT
Start: 2025-01-08

## 2025-01-08 RX ORDER — SODIUM FLUORIDE1.1%, POTASSIUM NITRATE 5% 5.8; 57.5 MG/ML; MG/ML
GEL, DENTIFRICE DENTAL
COMMUNITY
Start: 2024-12-11

## 2025-01-08 NOTE — PROGRESS NOTES
COVID-19 Outpatient Progress Note  Name: Margarito Hall Jr.      : 1990      MRN: [id number]  Encounter Provider: CHAPARRO Najera  Encounter Date: 2025   Encounter department: [facility]    Assessment & Plan  Positive self-administered antigen test for COVID-19         Acute cough    Orders:    promethazine-dextromethorphan (PHENERGAN-DM) 6.25-15 mg/5 mL oral syrup; Take 5 mL by mouth 4 (four) times a day as needed for cough      Disposition:     Rapid antigen testing was performed and the result is POSITIVE for COVID-19.     Discussed symptom directed medication options with patient. Discussed vitamin D, vitamin C, and/or zinc supplementation with patient.   Patient did a rapid covid test at home today    I have spent a total time of 10 minutes on the day of the encounter for this patient including instructions for management, patient and family education and importance of treatment compliance.       Depression Screening and Follow-up Plan: Patient was screened for depression during today's encounter. They screened negative with a PHQ-2 score of 0.        Encounter provider: CHAPARRO Najera     Provider located at: [address] [id number]     Recent Visits  No visits were found meeting these conditions.  Showing recent visits within past 7 days and meeting all other requirements  Today's Visits  Date Type Provider Dept   25 Office Visit CHAPARRO Najera [facility]   Showing today's visits and meeting all other requirements  Future Appointments  No visits were found meeting these conditions.  Showing future appointments within next 150 days and meeting all other requirements    History of Present Illness      Subjective:   Margarito Hall Jr. is a 34 y.o. male who is concerned about COVID-19. Patient's symptoms include nasal congestion, rhinorrhea and cough. Patient  "denies fever, sore throat, shortness of breath, nausea, vomiting, diarrhea and headaches.     - Date of symptom onset: 1/5/2025      COVID-19 vaccination status: Not vaccinated    Exposure:   Contact with a person who is under investigation (PUI) for or who is positive for COVID-19 within the last 14 days?: No    Hospitalized recently for fever and/or lower respiratory symptoms?: No      Currently a healthcare worker that is involved in direct patient care?: No      Works in a special setting where the risk of COVID-19 transmission may be high? (this may include long-term care, correctional and nursing home facilities; homeless shelters; assisted-living facilities and group homes.): No      Resident in a special setting where the risk of COVID-19 transmission may be high? (this may include long-term care, correctional and nursing home facilities; homeless shelters; assisted-living facilities and group homes.): No      Patient started with cold symptoms on Sunday. He took an at home covid test today and tested positive. He denies shortness of breath. Symptoms include nasal congestion, cough, no fever.    No results found for: \"SARSCOV2\", \"[id number]\", \"SARSCORONAVI\", \"CORONAVIRUSR\", \"SARSCOVAG\", \"SARSCOVAGH\"    Review of Systems   Constitutional:  Negative for activity change and fever.   HENT:  Positive for congestion, postnasal drip and rhinorrhea. Negative for sore throat.    Respiratory:  Positive for cough. Negative for shortness of breath.    Cardiovascular:  Negative for chest pain.   Gastrointestinal:  Negative for diarrhea, nausea and vomiting.   Neurological:  Negative for dizziness and headaches.     Objective   Pulse 100   Wt 66.2 kg (146 lb)   SpO2 98%   BMI 23.57 kg/m²     Physical Exam  Vitals and nursing note reviewed.   Constitutional:       General: He is not in acute distress.     Appearance: Normal appearance. He is ill-appearing. He is not diaphoretic.   HENT:      Head: Normocephalic.      Nose: " Congestion and rhinorrhea present.      Mouth/Throat:      Mouth: Mucous membranes are moist.      Pharynx: Oropharynx is clear. No oropharyngeal exudate or posterior oropharyngeal erythema.   Eyes:      Extraocular Movements: Extraocular movements intact.   Cardiovascular:      Rate and Rhythm: Normal rate and regular rhythm.      Heart sounds: Normal heart sounds.   Pulmonary:      Effort: Pulmonary effort is normal. No respiratory distress.      Breath sounds: Rhonchi present.      Comments: Cough present  Musculoskeletal:      Cervical back: Normal range of motion and neck supple.   Skin:     General: Skin is warm and dry.      Coloration: Skin is not pale.      Findings: No erythema.   Neurological:      Mental Status: He is alert and oriented to person, place, and time.   Psychiatric:         Mood and Affect: Mood normal.         Behavior: Behavior normal.

## 2025-01-08 NOTE — PATIENT INSTRUCTIONS
Increase fluids/rest  Otc cold/cough medications as directed.  Vitamin c, vitamin d and zinc as directed for immunity health.  Call if symptoms do not improve or worsen.

## 2025-04-11 ENCOUNTER — APPOINTMENT (OUTPATIENT)
Dept: LAB | Facility: HOSPITAL | Age: 35
End: 2025-04-11

## 2025-04-11 DIAGNOSIS — Z00.8 HEALTH EXAMINATION IN POPULATION SURVEY: ICD-10-CM

## 2025-04-11 LAB
CHOLEST SERPL-MCNC: 199 MG/DL (ref ?–200)
EST. AVERAGE GLUCOSE BLD GHB EST-MCNC: 105 MG/DL
HBA1C MFR BLD: 5.3 %
HDLC SERPL-MCNC: 45 MG/DL
LDLC SERPL CALC-MCNC: 139 MG/DL (ref 0–100)
NONHDLC SERPL-MCNC: 154 MG/DL
TRIGL SERPL-MCNC: 75 MG/DL (ref ?–150)

## 2025-04-11 PROCEDURE — 83036 HEMOGLOBIN GLYCOSYLATED A1C: CPT

## 2025-04-11 PROCEDURE — 80061 LIPID PANEL: CPT

## 2025-04-11 PROCEDURE — 36415 COLL VENOUS BLD VENIPUNCTURE: CPT

## 2025-06-06 ENCOUNTER — OFFICE VISIT (OUTPATIENT)
Dept: FAMILY MEDICINE CLINIC | Facility: HOSPITAL | Age: 35
End: 2025-06-06
Payer: COMMERCIAL

## 2025-06-06 VITALS
OXYGEN SATURATION: 98 % | HEART RATE: 73 BPM | BODY MASS INDEX: 23.72 KG/M2 | SYSTOLIC BLOOD PRESSURE: 116 MMHG | DIASTOLIC BLOOD PRESSURE: 80 MMHG | HEIGHT: 66 IN | TEMPERATURE: 97.9 F | WEIGHT: 147.6 LBS

## 2025-06-06 DIAGNOSIS — Z23 ENCOUNTER FOR IMMUNIZATION: ICD-10-CM

## 2025-06-06 DIAGNOSIS — Z76.89 ENCOUNTER TO ESTABLISH CARE: Primary | ICD-10-CM

## 2025-06-06 DIAGNOSIS — Z00.00 ANNUAL PHYSICAL EXAM: ICD-10-CM

## 2025-06-06 PROCEDURE — 90715 TDAP VACCINE 7 YRS/> IM: CPT

## 2025-06-06 PROCEDURE — 90471 IMMUNIZATION ADMIN: CPT

## 2025-06-06 PROCEDURE — 99385 PREV VISIT NEW AGE 18-39: CPT

## 2025-06-06 NOTE — PATIENT INSTRUCTIONS
"Patient Education     Routine physical for adults   The Basics   Written by the doctors and editors at [facility]   What is a physical? -- A physical is a routine visit, or \"check-up,\" with your doctor. You might also hear it called a \"wellness visit\" or \"preventive visit.\"  During each visit, the doctor will:   Ask about your physical and mental health   Ask about your habits, behaviors, and lifestyle   Do an exam   Give you vaccines if needed   Talk to you about any medicines you take   Give advice about your health   Answer your questions  Getting regular check-ups is an important part of taking care of your health. It can help your doctor find and treat any problems you have. But it's also important for preventing health problems.  A routine physical is different from a \"sick visit.\" A sick visit is when you see a doctor because of a health concern or problem. Since physicals are scheduled ahead of time, you can think about what you want to ask the doctor.  How often should I get a physical? -- It depends on your age and health. In general, for people age 21 years and older:   If you are younger than 50 years, you might be able to get a physical every 3 years.   If you are 50 years or older, your doctor might recommend a physical every year.  If you have an ongoing health condition, like diabetes or high blood pressure, your doctor will probably want to see you more often.  What happens during a physical? -- In general, each visit will include:   Physical exam - The doctor or nurse will check your height, weight, heart rate, and blood pressure. They will also look at your eyes and ears. They will ask about how you are feeling and whether you have any symptoms that bother you.   Medicines - It's a good idea to bring a list of all the medicines you take to each doctor visit. Your doctor will talk to you about your medicines and answer any questions. Tell them if you are having any side effects that bother you. You " "should also tell them if you are having trouble paying for any of your medicines.   Habits and behaviors - This includes:   Your diet   Your exercise habits   Whether you smoke, drink alcohol, or use drugs   Whether you are sexually active   Whether you feel safe at home  Your doctor will talk to you about things you can do to improve your health and lower your risk of health problems. They will also offer help and support. For example, if you want to quit smoking, they can give you advice and might prescribe medicines. If you want to improve your diet or get more physical activity, they can help you with this, too.   Lab tests, if needed - The tests you get will depend on your age and situation. For example, your doctor might want to check your:   Cholesterol   Blood sugar   Iron level   Vaccines - The recommended vaccines will depend on your age, health, and what vaccines you already had. Vaccines are very important because they can prevent certain serious or deadly infections.   Discussion of screening - \"Screening\" means checking for diseases or other health problems before they cause symptoms. Your doctor can recommend screening based on your age, risk, and preferences. This might include tests to check for:   Cancer, such as breast, prostate, cervical, ovarian, colorectal, prostate, lung, or skin cancer   Sexually transmitted infections, such as chlamydia and gonorrhea   Mental health conditions like depression and anxiety  Your doctor will talk to you about the different types of screening tests. They can help you decide which screenings to have. They can also explain what the results might mean.   Answering questions - The physical is a good time to ask the doctor or nurse questions about your health. If needed, they can refer you to other doctors or specialists, too.  Adults older than 65 years often need other care, too. As you get older, your doctor will talk to you about:   How to prevent falling at " home   Hearing or vision tests   Memory testing   How to take your medicines safely   Making sure that you have the help and support you need at home  All topics are updated as new evidence becomes available and our peer review process is complete.  This topic retrieved from [employer] on: May 02, 2024.  Topic 767366 Version 1.0  Release: 32.4.3 - C32.122  © 2024 Up[employer], Inc[employer] and/or its affiliates. All rights reserved.  Consumer Information Use and Disclaimer   Disclaimer: This generalized information is a limited summary of diagnosis, treatment, and/or medication information. It is not meant to be comprehensive and should be used as a tool to help the user understand and/or assess potential diagnostic and treatment options. It does NOT include all information about conditions, treatments, medications, side effects, or risks that may apply to a specific patient. It is not intended to be medical advice or a substitute for the medical advice, diagnosis, or treatment of a health care provider based on the health care provider's examination and assessment of a patient's specific and unique circumstances. Patients must speak with a health care provider for complete information about their health, medical questions, and treatment options, including any risks or benefits regarding use of medications. This information does not endorse any treatments or medications as safe, effective, or approved for treating a specific patient. UpToDate, Inc. and its affiliates disclaim any warranty or liability relating to this information or the use thereof.The use of this information is governed by the Terms of Use, available at https://www.woltersSHADOWuwer.com/en/know/clinical-effectiveness-terms. 2024© UpToDate, Inc. and its affiliates and/or licensors. All rights reserved.  Copyright   © 2024 [employer]Date, Inc. and/or its affiliates. All rights reserved.

## 2025-06-06 NOTE — PROGRESS NOTES
"Adult Annual Physical  Name: Margarito Hall Jr.      : 1990      MRN: [id number]  Encounter Provider: Daja Lovelace DO  Encounter Date: 2025   Encounter department: [facility] [address]    :  Assessment & Plan  Encounter to establish care         Annual physical exam         Encounter for immunization    Orders:    TDAP VACCINE GREATER THAN OR EQUAL TO 6YO IM        Preventive Screenings:    - Prostate cancer screening: screening not indicated          History of Present Illness     Adult Annual Physical:  Patient presents for annual physical.  No other concerns.  .     Diet and Physical Activity:  - Diet/Nutrition: well balanced diet.  - Exercise: 5-7 times a week on average.    General Health:  - Sleep: 7-8 hours of sleep on average.  - Hearing: normal hearing bilateral ears.  - Vision: no vision problems.  - Dental: regular dental visits.     Health:  - History of STDs: no.   - Urinary symptoms: none.       Preventative Screening  Denies Family history of breast cancer, colon cancer, prostate cancer  Labs: Hgb A1C ordered, FLP ordered    Immunizations  Last TDAP , agreeable    Wt Readings from Last 3 Encounters:   25 67 kg (147 lb 9.6 oz)   25 66.2 kg (146 lb)   11/15/24 64 kg (141 lb)           Family History  reviewed    Allergies  denies    Social History  Tobacco - denies history  Alcohol - once a week  Illicit Drugs - marijuana daily        Review of Systems   Respiratory:  Negative for shortness of breath.    Cardiovascular:  Negative for chest pain.   Gastrointestinal:  Negative for abdominal pain, nausea and vomiting.   Skin:  Negative for rash.   Psychiatric/Behavioral:  Negative for sleep disturbance.      Medications Ordered Prior to Encounter[1]   Social History[2]    Objective   /80   Pulse 73   Temp 97.9 °F (36.6 °C) (Tympanic)   Ht 5' 6\" (1.676 m)   Wt 67 kg (147 lb 9.6 oz)   SpO2 98%   BMI 23.82 kg/m²     Physical " Exam  Vitals reviewed. Exam conducted with a chaperone present (wife).   Constitutional:       General: He is not in acute distress.     Appearance: Normal appearance. He is not ill-appearing.   HENT:      Head: Normocephalic and atraumatic.      Right Ear: Tympanic membrane, ear canal and external ear normal. There is no impacted cerumen.      Left Ear: Tympanic membrane, ear canal and external ear normal. There is no impacted cerumen.      Nose: Nose normal.     Cardiovascular:      Rate and Rhythm: Normal rate and regular rhythm.      Heart sounds: Normal heart sounds.   Pulmonary:      Effort: Pulmonary effort is normal.      Breath sounds: Normal breath sounds.     Neurological:      Mental Status: He is alert.     Psychiatric:         Mood and Affect: Mood normal.         Behavior: Behavior normal.           Daja Lovelace,   Family Medicine         [1]   Current Outpatient Medications on File Prior to Visit   Medication Sig Dispense Refill    [DISCONTINUED] Sodium Fluoride 5000 Sensitive 1.1-5 % GEL       EPINEPHrine (EPIPEN) 0.3 mg/0.3 mL SOAJ Inject 0.3 mL (0.3 mg total) into a muscle once for 1 dose 0.6 mL 0    famotidine (PEPCID) 40 MG tablet TAKE 1 TABLET BY MOUTH EVERY DAY (Patient not taking: Reported on 1/8/2025) 90 tablet 1    omeprazole (PriLOSEC) 40 MG capsule Take 1 capsule (40 mg total) by mouth daily 90 capsule 0    promethazine-dextromethorphan (PHENERGAN-DM) 6.25-15 mg/5 mL oral syrup Take 5 mL by mouth 4 (four) times a day as needed for cough 180 mL 0    Sodium Fluoride 5000 PPM 1.1 % GEL USE AT NIGHTTIME IN PLACE OF REGULAR TOOTHPASTE. DO NOT EAT, DRINK OR RINSE AFTERWARDS. (Patient not taking: Reported on 1/8/2025)       No current facility-administered medications on file prior to visit.   [2]   Social History  Tobacco Use    Smoking status: Never    Smokeless tobacco: Never   Vaping Use    Vaping status: Never Used   Substance and Sexual Activity    Alcohol use: Yes      Alcohol/week: 1.0 standard drink of alcohol     Types: 1 Standard drinks or equivalent per week     Comment: occasional    Drug use: Yes     Frequency: 7.0 times per week     Types: Marijuana     Comment: I have my medical marijuana card    Sexual activity: Yes     Partners: Female     Birth control/protection: None

## 2025-06-27 DIAGNOSIS — K21.9 GASTROESOPHAGEAL REFLUX DISEASE, UNSPECIFIED WHETHER ESOPHAGITIS PRESENT: ICD-10-CM

## 2025-06-30 RX ORDER — OMEPRAZOLE 40 MG/1
40 CAPSULE, DELAYED RELEASE ORAL DAILY
Qty: 90 CAPSULE | Refills: 1 | Status: SHIPPED | OUTPATIENT
Start: 2025-06-30 | End: 2025-09-28

## 2025-08-01 ENCOUNTER — APPOINTMENT (OUTPATIENT)
Dept: LAB | Facility: HOSPITAL | Age: 35
End: 2025-08-01
Payer: COMMERCIAL

## 2025-08-01 DIAGNOSIS — C72.30 LOW-GRADE OPTIC PATHWAY GLIOMA (HCC): ICD-10-CM

## 2025-08-01 LAB
BUN SERPL-MCNC: 15 MG/DL (ref 5–25)
CREAT SERPL-MCNC: 1.03 MG/DL (ref 0.6–1.3)
GFR SERPL CREATININE-BSD FRML MDRD: 93 ML/MIN/1.73SQ M

## 2025-08-01 PROCEDURE — 82565 ASSAY OF CREATININE: CPT

## 2025-08-01 PROCEDURE — 36415 COLL VENOUS BLD VENIPUNCTURE: CPT

## 2025-08-01 PROCEDURE — 84520 ASSAY OF UREA NITROGEN: CPT

## 2025-08-08 ENCOUNTER — HOSPITAL ENCOUNTER (OUTPATIENT)
Dept: MRI IMAGING | Facility: HOSPITAL | Age: 35
End: 2025-08-08
Payer: COMMERCIAL